# Patient Record
Sex: FEMALE | Race: OTHER
[De-identification: names, ages, dates, MRNs, and addresses within clinical notes are randomized per-mention and may not be internally consistent; named-entity substitution may affect disease eponyms.]

---

## 2020-06-04 ENCOUNTER — HOSPITAL ENCOUNTER (EMERGENCY)
Dept: HOSPITAL 8 - ED | Age: 40
Discharge: HOME | End: 2020-06-04
Payer: MEDICAID

## 2020-06-04 VITALS — DIASTOLIC BLOOD PRESSURE: 110 MMHG | SYSTOLIC BLOOD PRESSURE: 146 MMHG

## 2020-06-04 VITALS — BODY MASS INDEX: 30.74 KG/M2 | WEIGHT: 173.5 LBS | HEIGHT: 63 IN

## 2020-06-04 DIAGNOSIS — H00.015: Primary | ICD-10-CM

## 2020-06-04 DIAGNOSIS — F17.290: ICD-10-CM

## 2020-06-04 PROCEDURE — 99283 EMERGENCY DEPT VISIT LOW MDM: CPT

## 2020-06-04 NOTE — NUR
THIS IS A 39Y F THAT COMES IN FOR SWELLING AND PAIN WORSENING OVER DAYS. PT 
DENIES TRAUMA OR VISION CHANGES.

## 2020-06-19 ENCOUNTER — HOSPITAL ENCOUNTER (EMERGENCY)
Dept: HOSPITAL 8 - ED | Age: 40
Discharge: LEFT BEFORE DISCHARGE | End: 2020-06-19
Payer: MEDICAID

## 2020-06-19 VITALS — SYSTOLIC BLOOD PRESSURE: 139 MMHG | DIASTOLIC BLOOD PRESSURE: 86 MMHG

## 2020-06-19 VITALS — BODY MASS INDEX: 30.7 KG/M2 | WEIGHT: 173.28 LBS | HEIGHT: 63 IN

## 2020-06-19 DIAGNOSIS — H00.016: Primary | ICD-10-CM

## 2020-06-19 DIAGNOSIS — Z53.21: ICD-10-CM

## 2021-06-28 ENCOUNTER — HOSPITAL ENCOUNTER (EMERGENCY)
Dept: HOSPITAL 8 - ED | Age: 41
Discharge: HOME | End: 2021-06-28
Payer: MEDICAID

## 2021-06-28 VITALS — HEIGHT: 63 IN | WEIGHT: 187.83 LBS | BODY MASS INDEX: 33.28 KG/M2

## 2021-06-28 VITALS — SYSTOLIC BLOOD PRESSURE: 141 MMHG | DIASTOLIC BLOOD PRESSURE: 81 MMHG

## 2021-06-28 DIAGNOSIS — R06.02: ICD-10-CM

## 2021-06-28 DIAGNOSIS — B34.9: ICD-10-CM

## 2021-06-28 DIAGNOSIS — J06.9: Primary | ICD-10-CM

## 2021-06-28 DIAGNOSIS — Z20.822: ICD-10-CM

## 2021-06-28 PROCEDURE — 71045 X-RAY EXAM CHEST 1 VIEW: CPT

## 2021-06-28 PROCEDURE — 96375 TX/PRO/DX INJ NEW DRUG ADDON: CPT

## 2021-06-28 PROCEDURE — U0003 INFECTIOUS AGENT DETECTION BY NUCLEIC ACID (DNA OR RNA); SEVERE ACUTE RESPIRATORY SYNDROME CORONAVIRUS 2 (SARS-COV-2) (CORONAVIRUS DISEASE [COVID-19]), AMPLIFIED PROBE TECHNIQUE, MAKING USE OF HIGH THROUGHPUT TECHNOLOGIES AS DESCRIBED BY CMS-2020-01-R: HCPCS

## 2021-06-28 PROCEDURE — 96361 HYDRATE IV INFUSION ADD-ON: CPT

## 2021-06-28 PROCEDURE — 96374 THER/PROPH/DIAG INJ IV PUSH: CPT

## 2021-06-28 PROCEDURE — 99284 EMERGENCY DEPT VISIT MOD MDM: CPT

## 2021-06-28 NOTE — NUR
Patient given discharge instructions and RX, they have confirmed that they 
understand the instructions.  Patient ambulatory with steady gait.

## 2021-08-20 ENCOUNTER — HOSPITAL ENCOUNTER (EMERGENCY)
Dept: HOSPITAL 8 - ED | Age: 41
Discharge: LEFT BEFORE DISCHARGE | End: 2021-08-20
Payer: MEDICAID

## 2021-08-20 VITALS — DIASTOLIC BLOOD PRESSURE: 95 MMHG | SYSTOLIC BLOOD PRESSURE: 139 MMHG

## 2021-08-20 VITALS — HEIGHT: 63 IN | BODY MASS INDEX: 33.95 KG/M2 | WEIGHT: 191.58 LBS

## 2021-08-20 DIAGNOSIS — R50.9: Primary | ICD-10-CM

## 2021-08-20 DIAGNOSIS — Z53.21: ICD-10-CM

## 2021-08-30 ENCOUNTER — APPOINTMENT (OUTPATIENT)
Dept: URGENT CARE | Facility: PHYSICIAN GROUP | Age: 41
End: 2021-08-30
Payer: MEDICAID

## 2021-09-22 ENCOUNTER — OFFICE VISIT (OUTPATIENT)
Dept: URGENT CARE | Facility: CLINIC | Age: 41
End: 2021-09-22
Payer: MEDICAID

## 2021-09-22 ENCOUNTER — APPOINTMENT (OUTPATIENT)
Dept: RADIOLOGY | Facility: IMAGING CENTER | Age: 41
End: 2021-09-22
Attending: NURSE PRACTITIONER
Payer: MEDICAID

## 2021-09-22 VITALS
OXYGEN SATURATION: 100 % | TEMPERATURE: 97.6 F | HEIGHT: 63 IN | DIASTOLIC BLOOD PRESSURE: 86 MMHG | HEART RATE: 107 BPM | SYSTOLIC BLOOD PRESSURE: 122 MMHG | BODY MASS INDEX: 34.48 KG/M2 | WEIGHT: 194.6 LBS | RESPIRATION RATE: 14 BRPM

## 2021-09-22 DIAGNOSIS — M79.641 RIGHT HAND PAIN: ICD-10-CM

## 2021-09-22 DIAGNOSIS — S66.911A HAND STRAIN, RIGHT, INITIAL ENCOUNTER: ICD-10-CM

## 2021-09-22 PROCEDURE — 73130 X-RAY EXAM OF HAND: CPT | Mod: TC,RT | Performed by: NURSE PRACTITIONER

## 2021-09-22 PROCEDURE — 99204 OFFICE O/P NEW MOD 45 MIN: CPT | Performed by: NURSE PRACTITIONER

## 2021-09-22 RX ORDER — IBUPROFEN 800 MG/1
800 TABLET ORAL EVERY 8 HOURS PRN
Qty: 30 TABLET | Refills: 0 | Status: SHIPPED | OUTPATIENT
Start: 2021-09-22 | End: 2022-01-07

## 2021-09-22 RX ORDER — PREDNISONE 20 MG/1
60 TABLET ORAL DAILY
Qty: 15 TABLET | Refills: 0 | Status: SHIPPED | OUTPATIENT
Start: 2021-09-22 | End: 2021-09-27

## 2021-09-22 ASSESSMENT — ENCOUNTER SYMPTOMS
CONSTITUTIONAL NEGATIVE: 1
TINGLING: 1

## 2021-09-22 ASSESSMENT — VISUAL ACUITY: OU: 1

## 2021-09-22 NOTE — PROGRESS NOTES
"Subjective:     Jagruti Harrison is a 41 y.o. female who presents for Hand Pain (x 4 days ago radiating up R shoulder.  denies any known injury. )       Other  This is a new problem. The problem has been gradually worsening.     Patient reports that 4 days ago, she started to notice right hand pain and tenderness.  Feels pain radiating from her right hand up her forearm and her upper arm.  Denies injury.  Denies history of similar problems.    Patient was screened prior to rooming and denied COVID-19 diagnosis or contact with a person who has been diagnosed or is suspected to have COVID-19. During this visit, appropriate PPE was worn, hand hygiene was performed, and the patient and any visitors were masked.     PMH:  has no past medical history on file.    MEDS: No current outpatient medications on file.    ALLERGIES: No Known Allergies    SURGHX: No past surgical history on file.    SOCHX:  reports that she has never smoked. She has never used smokeless tobacco. She reports that she does not drink alcohol and does not use drugs.     FH: Reviewed with patient, not pertinent to this visit.    Review of Systems   Constitutional: Negative.    Musculoskeletal:        Right hand pain radiating up arm   Neurological: Positive for tingling.   All other systems reviewed and are negative.    Additional details per HPI.      Objective:     /86   Pulse (!) 107   Temp 36.4 °C (97.6 °F) (Temporal)   Resp 14   Ht 1.6 m (5' 3\")   Wt 88.3 kg (194 lb 9.6 oz)   SpO2 100%   BMI 34.47 kg/m²     Physical Exam  Vitals reviewed.   Constitutional:       General: She is not in acute distress.     Appearance: She is well-developed. She is not ill-appearing or toxic-appearing.   HENT:      Head: Normocephalic.      Right Ear: External ear normal.      Left Ear: External ear normal.   Eyes:      General: Vision grossly intact.      Extraocular Movements: Extraocular movements intact.      Conjunctiva/sclera: Conjunctivae normal. "   Cardiovascular:      Rate and Rhythm: Normal rate.   Pulmonary:      Effort: Pulmonary effort is normal. No respiratory distress.   Musculoskeletal:         General: No deformity. Normal range of motion.      Right shoulder: No swelling, deformity or tenderness. Normal range of motion.      Right upper arm: No swelling, deformity, lacerations or tenderness.      Right elbow: No swelling, deformity or lacerations. Normal range of motion. No tenderness.      Right forearm: No swelling, deformity, lacerations or tenderness.      Right wrist: No swelling, deformity, lacerations or tenderness. Normal range of motion.      Right hand: Swelling (Mild compared left) and tenderness (Volar aspect) present. No deformity or lacerations. Normal range of motion. Normal strength. Normal sensation. Normal capillary refill.      Cervical back: Normal range of motion.   Skin:     General: Skin is warm and dry.      Capillary Refill: Capillary refill takes less than 2 seconds.      Coloration: Skin is not pale.      Findings: No bruising, erythema or rash.   Neurological:      Mental Status: She is alert and oriented to person, place, and time.      Sensory: No sensory deficit.      Motor: No weakness.      Coordination: Coordination normal.   Psychiatric:         Behavior: Behavior normal. Behavior is cooperative.     X-ray of right hand:    ***    Radiology report and images reviewed by myself. Concur with findings.***      Assessment/Plan:     1. Right hand pain  - DX-HAND 3+ RIGHT; Future    ***    Differential diagnosis, natural history, supportive care, ***over-the-counter symptom management per 's instructions, ***close monitoring, and indications for immediate follow-up discussed.     All questions answered. Patient agrees with the plan of care.    ***

## 2021-09-22 NOTE — PROGRESS NOTES
"Subjective:     Jagruti Harrison is a 41 y.o. female who presents for Hand Pain (x 4 days ago radiating up R shoulder.  denies any known injury. )       Other  This is a new problem. The problem has been gradually worsening.     Patient reports that 4 days ago, she started to notice right hand pain and tenderness.  Feels pain radiating from her right hand up her forearm and her upper arm.  Hand and fingers feel tingly.  Denies injury.  Denies history of similar problems.  Right hand dominant.  Tx: none.    Patient was screened prior to rooming and denied COVID-19 diagnosis or contact with a person who has been diagnosed or is suspected to have COVID-19. During this visit, appropriate PPE was worn, hand hygiene was performed, and the patient and any visitors were masked.     PMH:  has no past medical history on file.    MEDS:   Current Outpatient Medications:   •  predniSONE (DELTASONE) 20 MG Tab, Take 3 Tablets by mouth every day for 5 days., Disp: 15 Tablet, Rfl: 0  •  ibuprofen (MOTRIN) 800 MG Tab, Take 1 Tablet by mouth every 8 hours as needed for Moderate Pain or Inflammation. Start 24 hours after finishing prednisone., Disp: 30 Tablet, Rfl: 0    ALLERGIES: No Known Allergies    SURGHX: History reviewed. No pertinent surgical history.    SOCHX:  reports that she has never smoked. She has never used smokeless tobacco. She reports that she does not drink alcohol and does not use drugs.     FH: Reviewed with patient, not pertinent to this visit.    Review of Systems   Constitutional: Negative.    Musculoskeletal:        Right hand pain radiating up arm   Neurological: Positive for tingling.   All other systems reviewed and are negative.    Additional details per HPI.      Objective:     /86   Pulse (!) 107   Temp 36.4 °C (97.6 °F) (Temporal)   Resp 14   Ht 1.6 m (5' 3\")   Wt 88.3 kg (194 lb 9.6 oz)   SpO2 100%   BMI 34.47 kg/m²     Physical Exam  Vitals reviewed.   Constitutional:       General: She " is not in acute distress.     Appearance: She is well-developed. She is not ill-appearing or toxic-appearing.   HENT:      Head: Normocephalic.      Right Ear: External ear normal.      Left Ear: External ear normal.   Eyes:      General: Vision grossly intact.      Extraocular Movements: Extraocular movements intact.      Conjunctiva/sclera: Conjunctivae normal.   Cardiovascular:      Rate and Rhythm: Normal rate.  Pulmonary:      Effort: Pulmonary effort is normal. No respiratory distress.   Musculoskeletal:         General: No deformity. Normal range of motion.      Right shoulder: No swelling, deformity or tenderness. Normal range of motion.      Right upper arm: No swelling, deformity, lacerations or tenderness.      Right elbow: No swelling, deformity or lacerations. Normal range of motion. No tenderness.      Right forearm: No swelling, deformity, lacerations or tenderness.      Right wrist: No swelling, deformity, lacerations or tenderness. Normal range of motion.      Right hand: Swelling (Mild compared left) and tenderness (Volar aspect) present. No deformity or lacerations. Normal range of motion. Normal strength. Normal sensation. Normal capillary refill.      Cervical back: Normal range of motion.   Skin:     General: Skin is warm and dry.      Capillary Refill: Capillary refill takes less than 2 seconds.      Coloration: Skin is not pale.      Findings: No bruising, erythema or rash.   Neurological:      Mental Status: She is alert and oriented to person, place, and time.      Sensory: No sensory deficit.      Motor: No weakness.      Coordination: Coordination normal.   Psychiatric:         Behavior: Behavior normal. Behavior is cooperative.     X-ray of right hand:    Details    Reading Physician Reading Date Result Priority   Alvaro Magaña M.D.  [phone] [facility]   Narrative & Impression     9/22/2021 10:46 AM     HISTORY/REASON FOR EXAM:  Atraumatic Pain/Swelling/Deformity.  RIGHT  hand pain.     TECHNIQUE/EXAM DESCRIPTION AND NUMBER OF VIEWS:  3 views of the RIGHT hand.     COMPARISON: None     FINDINGS:  No focal soft tissue swelling.  Joint spaces are preserved.  No marginal erosions or soft tissue calcifications.  No fracture or dislocation.     IMPRESSION:     Unremarkable RIGHT hand.        Exam Ended: 09/22/21 10:55 AM Last Resulted: 09/22/21 10:59 AM        Radiology report and images reviewed by myself. Concur with findings.      Assessment/Plan:     1. Right hand pain  - DX-HAND 3+ RIGHT; Future  - predniSONE (DELTASONE) 20 MG Tab; Take 3 Tablets by mouth every day for 5 days.  Dispense: 15 Tablet; Refill: 0  - ibuprofen (MOTRIN) 800 MG Tab; Take 1 Tablet by mouth every 8 hours as needed for Moderate Pain or Inflammation. Start 24 hours after finishing prednisone.  Dispense: 30 Tablet; Refill: 0  - REFERRAL TO SPORTS MEDICINE    2. Hand strain, right, initial encounter  - predniSONE (DELTASONE) 20 MG Tab; Take 3 Tablets by mouth every day for 5 days.  Dispense: 15 Tablet; Refill: 0  - ibuprofen (MOTRIN) 800 MG Tab; Take 1 Tablet by mouth every 8 hours as needed for Moderate Pain or Inflammation. Start 24 hours after finishing prednisone.  Dispense: 30 Tablet; Refill: 0  - REFERRAL TO SPORTS MEDICINE    Rx as above sent electronically.    Work note provided.    Discharge summary provided.     Differential diagnosis, natural history, supportive care, over-the-counter symptom management per 's instructions, close monitoring, and indications for immediate follow-up discussed.     All questions answered. Patient agrees with the plan of care.

## 2021-09-22 NOTE — PATIENT INSTRUCTIONS
Details    Reading Physician Reading Date Result Priority   Alvaro Magaña M.D.  [phone] [facility]/22/2021 [facility]   Narrative & Impression     9/22/2021 10:46 AM     HISTORY/REASON FOR EXAM:  Atraumatic Pain/Swelling/Deformity.  RIGHT hand pain.     TECHNIQUE/EXAM DESCRIPTION AND NUMBER OF VIEWS:  3 views of the RIGHT hand.     COMPARISON: None     FINDINGS:  No focal soft tissue swelling.  Joint spaces are preserved.  No marginal erosions or soft tissue calcifications.  No fracture or dislocation.     IMPRESSION:     Unremarkable RIGHT hand.        Exam Ended: 09/22/21 10:55 AM Last Resulted: 09/22/21 10:59 AM            Repetitive Strain Injuries  Repetitive strain injuries (RSIs) result from the overuse or misuse of muscles, nerves, or tissues that connect muscle to bone (tendons). RSIs can affect almost any part of the body. However, RSIs are most common in:  · The arms, including the thumbs, wrists, elbows, and shoulders.  · The legs, including the ankles and knees.  Repetitive strain often causes certain common medical conditions, such as carpal tunnel syndrome, trigger finger, tennis elbow, and golfer's elbow.  What are the causes?  RSIs are caused by repeating the same activity for long periods of time without enough rest. Repetitive strain often results from activities done:  · At work, such as typing for long periods of time.  · During a hobby or sport.  What increases the risk?  Certain workplace and personal factors may make you more likely to develop an RSI.  Workplace risk factors  · Frequent computer use.  · Not taking rest breaks.  · Doing motions over and over again.  · Working in an awkward position or holding the same position for a long time.  · Forceful movements, such as lifting, pulling, or pushing.  · Exposure to the vibration caused by using power tools.  · Working in cold temperatures.  Personal risk factors  · Having poor posture.  · Not exercising regularly.  · Being  overweight.  · Having long-term (chronic) medical conditions, such as arthritis, diabetes, or thyroid problems.  · Having vitamin deficiencies.  What are the signs or symptoms?  You may feel these symptoms at the affected body part:  · Burning, shooting, or aching pain.  · Tenderness.  · Swelling.  · Tingling or numbness.  · Weakness, heaviness, or loss of coordination.  · Fatigue.  · Muscle stiffness.  · Sudden, involuntary muscle tightening (spasms).  · Difficulty moving.  How is this diagnosed?  An RSI may be diagnosed with a physical exam and an evaluation of your everyday activities. You may have tests, including:  · X-rays.  · Electromyogram (EMG). This test measures electrical signals that your nerves send to your muscles.  How is this treated?  Treatment depends on the severity and type of RSI you have. Treatment may include:  · Rest.  · Ice or heat therapy.  · NSAIDS to reduce pain and swelling.  · Steroid injections.  · Wearing a splint.  · Wearing a wrap that applies pressure (compression bandage).  · Having occupational or physical therapy.  · Having surgery.  RSIs may take weeks or months to heal, depending on your condition.  Follow these instructions at home:  If you have a splint:  · Wear the splint as told by your health care provider. Remove it only as told by your health care provider.  · Loosen the splint if your fingers or toes tingle, become numb, or turn cold and blue.  · Keep the splint clean.  · If the splint is not waterproof:  ? Do not let it get wet.  ? Cover it with a watertight covering when you take a bath or shower.  Managing pain, stiffness, and swelling         · If directed, put ice on the injured area.  ? If you have a removable splint, remove it as told by your health care provider.  ? Put ice in a plastic bag.  ? Place a towel between your skin and the bag.  ? Leave the ice on for 20 minutes, 2-3 times a day.  · If directed, apply heat to the affected area as often as told by  your health care provider. Use the heat source that your health care provider recommends, such as a moist heat pack or a heating pad.  ? Place a towel between your skin and the heat source.  ? Leave the heat on for 20-30 minutes.  ? Remove the heat if your skin turns bright red. This is especially important if you are unable to feel pain, heat, or cold. You may have a greater risk of getting burned.  · If your arm or leg is affected, move your fingers or toes often to reduce stiffness and swelling.  · If possible, raise (elevate) the injured area above the level of your heart while you are sitting or lying down.  Activity  · Rest your affected body part as told by your health care provider.  · Stop or modify activities that cause your symptoms or make them worse, as told by your health care provider.  · Return to your normal activities as told by your health care provider. Ask your health care provider what activities are safe for you.  · Do exercises as told by your health care provider.  General instructions  · Take over-the-counter and prescription medicines only as told by your health care provider.  · Wear a compression bandage as told by your health care provider.  · If your condition is work-related, talk to your employer about changes that can be made.  · Keep all follow-up visits as told by your health care provider. This is important.  How is this prevented?  Maintain good posture  Good posture means that:  · Your spine is in its natural position.  · Your feet are flat on the floor.  · Your knees are directly over your feet.  · Your arms are relaxed and at your sides.  · Your neck is relaxed and not bent forward or backward.  Adjust your work station  Arrange your work station so that you can maintain good posture. To do this:  · Sit in a chair that supports the natural curve of your lower back.  · Slide your chair under your desk so that you are close enough to reach your keyboard and computer mouse with  your elbows at your side, bent at a right angle, and your forearms parallel to the ground.  · Have your computer monitor directly in front of you so that your eyes are level with the top of the screen. The screen should be about 15-25 inches (38.1-63.5 cm) from your eyes.    Other tips  · Take breaks often from any repeated activity. Alternate with another task that requires you to use different muscles.  · Change positions regularly.  · Exercise regularly.  · Maintain a healthy weight.  Contact a health care provider if:  · You develop new symptoms.  · Your symptoms get worse or do not improve with medicine.  Summary  · Repetitive strain injuries (RSIs) result from the overuse or misuse of muscles, nerves, or tissues that connect muscle to bone (tendons).  · Symptoms are pain, burning, swelling, stiffness, weakness, numbness, or spasms in the affected area.  · These injuries are treated with rest, ice, heat, medicines, physical or occupational therapy, splinting, and surgery if needed.  This information is not intended to replace advice given to you by your health care provider. Make sure you discuss any questions you have with your health care provider.  Document Released: 12/08/2003 Document Revised: 11/14/2019 Document Reviewed: 11/14/2019  [employer] Patient Education © 2020 [employer]      A referral request has been placed for sports medicine. We have a referrals department that is tasked with locating a suitable office that currently accepts patients and your insurance and you should be receiving referral information from them such as the office name, address, and number. This process usually takes around 3 business days. If you are not contacted by our referrals department, please call [phone].

## 2021-09-22 NOTE — LETTER
September 22, 2021         Patient: Jagruti Harrison   YOB: 1980   Date of Visit: 9/22/2021           To Whom it May Concern:    Jagruti Harrison was seen in my clinic on 9/22/2021. Due to medical necessity, patient is advised of the following physical restrictions:    · No pushing, pulling, lifting, fine manipulations, grasping with right hand    Restrictions in effect for the next 7 days or until as advised by sports medicine.     If you have any questions or concerns, please don't hesitate to call.        Sincerely,           PRISCILLA Olivares.  Electronically Signed

## 2021-11-09 ENCOUNTER — OFFICE VISIT (OUTPATIENT)
Dept: URGENT CARE | Facility: CLINIC | Age: 41
End: 2021-11-09
Payer: MEDICAID

## 2021-11-09 ENCOUNTER — HOSPITAL ENCOUNTER (OUTPATIENT)
Facility: MEDICAL CENTER | Age: 41
End: 2021-11-09
Attending: NURSE PRACTITIONER
Payer: MEDICAID

## 2021-11-09 VITALS
HEART RATE: 94 BPM | HEIGHT: 63 IN | DIASTOLIC BLOOD PRESSURE: 80 MMHG | BODY MASS INDEX: 32.92 KG/M2 | WEIGHT: 185.8 LBS | TEMPERATURE: 98.5 F | SYSTOLIC BLOOD PRESSURE: 132 MMHG | OXYGEN SATURATION: 100 % | RESPIRATION RATE: 16 BRPM

## 2021-11-09 DIAGNOSIS — N20.0 KIDNEY STONE: ICD-10-CM

## 2021-11-09 DIAGNOSIS — R31.9 HEMATURIA, UNSPECIFIED TYPE: ICD-10-CM

## 2021-11-09 DIAGNOSIS — R10.9 FLANK PAIN: ICD-10-CM

## 2021-11-09 LAB
APPEARANCE UR: NORMAL
BILIRUB UR STRIP-MCNC: NEGATIVE MG/DL
COLOR UR AUTO: YELLOW
GLUCOSE UR STRIP.AUTO-MCNC: NEGATIVE MG/DL
INT CON NEG: NEGATIVE
INT CON POS: POSITIVE
KETONES UR STRIP.AUTO-MCNC: NEGATIVE MG/DL
LEUKOCYTE ESTERASE UR QL STRIP.AUTO: NORMAL
NITRITE UR QL STRIP.AUTO: NEGATIVE
PH UR STRIP.AUTO: 5.5 [PH] (ref 5–8)
POC URINE PREGNANCY TEST: NEGATIVE
PROT UR QL STRIP: NEGATIVE MG/DL
RBC UR QL AUTO: NORMAL
SP GR UR STRIP.AUTO: >=1.03
UROBILINOGEN UR STRIP-MCNC: NORMAL MG/DL

## 2021-11-09 PROCEDURE — 99215 OFFICE O/P EST HI 40 MIN: CPT | Performed by: NURSE PRACTITIONER

## 2021-11-09 PROCEDURE — 81025 URINE PREGNANCY TEST: CPT | Performed by: NURSE PRACTITIONER

## 2021-11-09 PROCEDURE — 81002 URINALYSIS NONAUTO W/O SCOPE: CPT | Performed by: NURSE PRACTITIONER

## 2021-11-09 PROCEDURE — 87086 URINE CULTURE/COLONY COUNT: CPT

## 2021-11-09 PROCEDURE — 87186 SC STD MICRODIL/AGAR DIL: CPT

## 2021-11-09 RX ORDER — SULFAMETHOXAZOLE AND TRIMETHOPRIM 800; 160 MG/1; MG/1
1 TABLET ORAL 2 TIMES DAILY
Qty: 20 TABLET | Refills: 0 | Status: SHIPPED | OUTPATIENT
Start: 2021-11-09 | End: 2021-11-19

## 2021-11-09 NOTE — LETTER
November 10, 2021         Patient: Jagruti Harrison   YOB: 1980   Date of Visit: 11/9/2021           To Whom it May Concern:    Jagruti Harrison was seen in my clinic on 11/9/2021. She may return to work on 11/10/21.    If you have any questions or concerns, please don't hesitate to call.        Sincerely,           PRISCILLA Mata.  Electronically Signed

## 2021-11-10 ENCOUNTER — TELEPHONE (OUTPATIENT)
Dept: URGENT CARE | Facility: CLINIC | Age: 41
End: 2021-11-10

## 2021-11-10 ENCOUNTER — HOSPITAL ENCOUNTER (OUTPATIENT)
Dept: RADIOLOGY | Facility: MEDICAL CENTER | Age: 41
End: 2021-11-10
Attending: NURSE PRACTITIONER
Payer: MEDICAID

## 2021-11-10 DIAGNOSIS — R10.9 FLANK PAIN: ICD-10-CM

## 2021-11-10 DIAGNOSIS — R10.9 ABDOMINAL PAIN, UNSPECIFIED ABDOMINAL LOCATION: ICD-10-CM

## 2021-11-10 DIAGNOSIS — R31.9 HEMATURIA, UNSPECIFIED TYPE: ICD-10-CM

## 2021-11-10 PROCEDURE — 74176 CT ABD & PELVIS W/O CONTRAST: CPT

## 2021-11-10 RX ORDER — TAMSULOSIN HYDROCHLORIDE 0.4 MG/1
0.4 CAPSULE ORAL
Qty: 30 CAPSULE | Refills: 0 | Status: ON HOLD | OUTPATIENT
Start: 2021-11-10 | End: 2022-01-11

## 2021-11-10 RX ORDER — IBUPROFEN 600 MG/1
600 TABLET ORAL 2 TIMES DAILY PRN
COMMUNITY
Start: 2021-09-22 | End: 2023-08-06

## 2021-11-10 ASSESSMENT — ENCOUNTER SYMPTOMS
SORE THROAT: 0
PAIN: 1
MYALGIAS: 0
NAUSEA: 0
ABDOMINAL PAIN: 0
FEVER: 0
CHILLS: 0
SHORTNESS OF BREATH: 0
VOMITING: 0
EYE REDNESS: 0
DIZZINESS: 0
FLANK PAIN: 1
BACK PAIN: 1

## 2021-11-11 NOTE — PROGRESS NOTES
"Subjective:   Jagruti Harrison is a 41 y.o. female who presents for Low Back Pain (side and low back pain x 1 month)      Pain  This is a new problem. The current episode started 1 to 4 weeks ago. The problem occurs constantly. The problem has been gradually worsening. Pertinent negatives include no abdominal pain, chest pain, chills, fever, myalgias, nausea, rash, sore throat, urinary symptoms or vomiting. Nothing aggravates the symptoms. She has tried acetaminophen for the symptoms. The treatment provided no relief.       Review of Systems   Constitutional: Negative for chills and fever.   HENT: Negative for sore throat.    Eyes: Negative for redness.   Respiratory: Negative for shortness of breath.    Cardiovascular: Negative for chest pain.   Gastrointestinal: Negative for abdominal pain, nausea and vomiting.   Genitourinary: Positive for flank pain. Negative for dysuria, frequency, hematuria and urgency.   Musculoskeletal: Positive for back pain. Negative for myalgias.   Skin: Negative for rash.   Neurological: Negative for dizziness.       Medications:    • ibuprofen Tabs  • sulfamethoxazole-trimethoprim    Allergies: Patient has no known allergies.    Problem List: Jagruti Harrison does not have a problem list on file.    Surgical History:  No past surgical history on file.    Past Social Hx: Jagruti Harrison  reports that she has never smoked. She has never used smokeless tobacco. She reports that she does not drink alcohol and does not use drugs.     Past Family Hx:  Jagruti Harrison family history is not on file.     Problem list, medications, and allergies reviewed by myself today in Epic.     Objective:     /80 (BP Location: Right arm, Patient Position: Sitting, BP Cuff Size: Adult)   Pulse 94   Temp 36.9 °C (98.5 °F) (Temporal)   Resp 16   Ht 1.6 m (5' 3\")   Wt 84.3 kg (185 lb 12.8 oz)   SpO2 100%   BMI 32.91 kg/m²     Physical Exam  Vitals and nursing note reviewed.   Constitutional:  "      General: She is not in acute distress.     Appearance: She is well-developed.   HENT:      Head: Normocephalic and atraumatic.      Right Ear: Tympanic membrane and external ear normal.      Left Ear: Tympanic membrane and external ear normal.      Nose: Nose normal.      Right Sinus: No maxillary sinus tenderness or frontal sinus tenderness.      Left Sinus: No maxillary sinus tenderness or frontal sinus tenderness.      Mouth/Throat:      Mouth: Mucous membranes are moist.      Pharynx: Uvula midline. No posterior oropharyngeal erythema.      Tonsils: No tonsillar exudate or tonsillar abscesses.   Eyes:      General:         Right eye: No discharge.         Left eye: No discharge.      Conjunctiva/sclera: Conjunctivae normal.   Cardiovascular:      Rate and Rhythm: Normal rate.   Pulmonary:      Effort: Pulmonary effort is normal. No respiratory distress.      Breath sounds: Normal breath sounds.   Abdominal:      General: Bowel sounds are normal. There is no distension.      Tenderness: There is no abdominal tenderness. There is left CVA tenderness. There is no right CVA tenderness, guarding or rebound. Negative signs include Vasquez's sign, Rovsing's sign, McBurney's sign, psoas sign and obturator sign.   Musculoskeletal:         General: Normal range of motion.   Skin:     General: Skin is warm and dry.   Neurological:      General: No focal deficit present.      Mental Status: She is alert and oriented to person, place, and time. Mental status is at baseline.      Gait: Gait (gait at baseline) normal.   Psychiatric:         Judgment: Judgment normal.         Assessment/Plan:     Diagnosis and associated orders:     1. Hematuria, unspecified type  sulfamethoxazole-trimethoprim (BACTRIM DS) 800-160 MG tablet    POCT Urinalysis    URINE CULTURE(NEW)    POCT Pregnancy    Referral to Urology   2. Flank pain  sulfamethoxazole-trimethoprim (BACTRIM DS) 800-160 MG tablet    POCT Urinalysis    URINE CULTURE(NEW)     POCT Pregnancy    Referral to Urology   3. Kidney stone  tamsulosin (FLOMAX) 0.4 MG capsule    Referral to Urology        Comments/MDM:     Results for orders placed or performed in visit on 11/09/21   POCT Urinalysis   Result Value Ref Range    POC Color Yellow Negative    POC Appearance Cloudy Negative    POC Leukocyte Esterase Moderate Negative    POC Nitrites Negative Negative    POC Urobiligen 1.0 E.U./dl Negative (0.2) mg/dL    POC Protein Negative Negative mg/dL    POC Urine PH 5.5 5.0 - 8.0    POC Blood Moderate Negative    POC Specific Gravity >=1.030 <1.005 - >1.030    POC Ketones Negative Negative mg/dL    POC Bilirubin Negative Negative mg/dL    POC Glucose Negative Negative mg/dL   POCT Pregnancy   Result Value Ref Range    POC Urine Pregnancy Test negative Negative    Internal Control Positive Positive     Internal Control Negative Negative  •       Patient 41-year-old female present with stated above, patient having left-sided CVA tenderness, hematuria noted, diagnostic CT will be obtained to rule out presence of stone.  Urinalysis with leukocytes concerning of infectious etiology will be treated for suspected bacterial etiology.  Patient declining Rocephin injection as she does not tolerate needles.  We will follow-up with pending results and treat as indicated.      Addendum: Telephone encounter 11/10: Contact patient in regards to CT findings, nonobstructing stones present.  Will patient continue with prescribed antibiotics until urine culture is finalized.  Will place urgent referral to urology for follow-up for further evaluation management.  Encouraged to continue with high-dose anti-inflammatories, will start on Flomax.  Differential diagnosis, natural history, supportive care, and indications for immediate follow-up discussed.             My total time spent caring for the patient on the day of the encounter was 45 minutes.   This does not include time spent on separately billable  procedures/tests.      Please note that this dictation was created using voice recognition software. I have made a reasonable attempt to correct obvious errors, but I expect that there are errors of grammar and possibly content that I did not discover before finalizing the note.    This note was electronically signed by Paul LEE.

## 2021-11-13 LAB
BACTERIA UR CULT: ABNORMAL
BACTERIA UR CULT: ABNORMAL
SIGNIFICANT IND 70042: ABNORMAL
SITE SITE: ABNORMAL
SOURCE SOURCE: ABNORMAL

## 2021-11-22 ENCOUNTER — HOSPITAL ENCOUNTER (OUTPATIENT)
Facility: MEDICAL CENTER | Age: 41
End: 2021-11-22
Attending: UROLOGY
Payer: MEDICAID

## 2021-11-22 PROCEDURE — 87086 URINE CULTURE/COLONY COUNT: CPT

## 2021-11-25 LAB
BACTERIA UR CULT: NORMAL
SIGNIFICANT IND 70042: NORMAL
SITE SITE: NORMAL
SOURCE SOURCE: NORMAL

## 2022-01-07 ENCOUNTER — PRE-ADMISSION TESTING (OUTPATIENT)
Dept: ADMISSIONS | Facility: MEDICAL CENTER | Age: 42
End: 2022-01-07
Attending: UROLOGY
Payer: MEDICAID

## 2022-01-07 DIAGNOSIS — Z01.812 PRE-OPERATIVE LABORATORY EXAMINATION: ICD-10-CM

## 2022-01-07 LAB
ANION GAP SERPL CALC-SCNC: 14 MMOL/L (ref 7–16)
APPEARANCE UR: ABNORMAL
BACTERIA #/AREA URNS HPF: ABNORMAL /HPF
BASOPHILS # BLD AUTO: 0.4 % (ref 0–1.8)
BASOPHILS # BLD: 0.04 K/UL (ref 0–0.12)
BILIRUB UR QL STRIP.AUTO: NEGATIVE
BUN SERPL-MCNC: 12 MG/DL (ref 8–22)
CALCIUM SERPL-MCNC: 8.7 MG/DL (ref 8.5–10.5)
CHLORIDE SERPL-SCNC: 105 MMOL/L (ref 96–112)
CO2 SERPL-SCNC: 19 MMOL/L (ref 20–33)
COLOR UR: ABNORMAL
CREAT SERPL-MCNC: 0.67 MG/DL (ref 0.5–1.4)
EOSINOPHIL # BLD AUTO: 0.52 K/UL (ref 0–0.51)
EOSINOPHIL NFR BLD: 5.5 % (ref 0–6.9)
EPI CELLS #/AREA URNS HPF: ABNORMAL /HPF
ERYTHROCYTE [DISTWIDTH] IN BLOOD BY AUTOMATED COUNT: 38.4 FL (ref 35.9–50)
GLUCOSE SERPL-MCNC: 98 MG/DL (ref 65–99)
GLUCOSE UR STRIP.AUTO-MCNC: NEGATIVE MG/DL
HCT VFR BLD AUTO: 42.5 % (ref 37–47)
HGB BLD-MCNC: 14.5 G/DL (ref 12–16)
HYALINE CASTS #/AREA URNS LPF: ABNORMAL /LPF
IMM GRANULOCYTES # BLD AUTO: 0.03 K/UL (ref 0–0.11)
IMM GRANULOCYTES NFR BLD AUTO: 0.3 % (ref 0–0.9)
KETONES UR STRIP.AUTO-MCNC: ABNORMAL MG/DL
LEUKOCYTE ESTERASE UR QL STRIP.AUTO: NEGATIVE
LYMPHOCYTES # BLD AUTO: 1.9 K/UL (ref 1–4.8)
LYMPHOCYTES NFR BLD: 20.1 % (ref 22–41)
MCH RBC QN AUTO: 28.7 PG (ref 27–33)
MCHC RBC AUTO-ENTMCNC: 34.1 G/DL (ref 33.6–35)
MCV RBC AUTO: 84 FL (ref 81.4–97.8)
MICRO URNS: ABNORMAL
MONOCYTES # BLD AUTO: 0.52 K/UL (ref 0–0.85)
MONOCYTES NFR BLD AUTO: 5.5 % (ref 0–13.4)
NEUTROPHILS # BLD AUTO: 6.46 K/UL (ref 2–7.15)
NEUTROPHILS NFR BLD: 68.2 % (ref 44–72)
NITRITE UR QL STRIP.AUTO: POSITIVE
NRBC # BLD AUTO: 0 K/UL
NRBC BLD-RTO: 0 /100 WBC
PH UR STRIP.AUTO: 6 [PH] (ref 5–8)
PLATELET # BLD AUTO: 381 K/UL (ref 164–446)
PMV BLD AUTO: 9.4 FL (ref 9–12.9)
POTASSIUM SERPL-SCNC: 4.1 MMOL/L (ref 3.6–5.5)
PROT UR QL STRIP: 100 MG/DL
RBC # BLD AUTO: 5.06 M/UL (ref 4.2–5.4)
RBC # URNS HPF: ABNORMAL /HPF
RBC UR QL AUTO: ABNORMAL
SODIUM SERPL-SCNC: 138 MMOL/L (ref 135–145)
SP GR UR STRIP.AUTO: >=1.03
UROBILINOGEN UR STRIP.AUTO-MCNC: 0.2 MG/DL
WBC # BLD AUTO: 9.5 K/UL (ref 4.8–10.8)
WBC #/AREA URNS HPF: ABNORMAL /HPF

## 2022-01-07 PROCEDURE — U0005 INFEC AGEN DETEC AMPLI PROBE: HCPCS

## 2022-01-07 PROCEDURE — 81001 URINALYSIS AUTO W/SCOPE: CPT

## 2022-01-07 PROCEDURE — 85025 COMPLETE CBC W/AUTO DIFF WBC: CPT

## 2022-01-07 PROCEDURE — C9803 HOPD COVID-19 SPEC COLLECT: HCPCS

## 2022-01-07 PROCEDURE — 87186 SC STD MICRODIL/AGAR DIL: CPT

## 2022-01-07 PROCEDURE — 80048 BASIC METABOLIC PNL TOTAL CA: CPT

## 2022-01-07 PROCEDURE — 87077 CULTURE AEROBIC IDENTIFY: CPT

## 2022-01-07 PROCEDURE — 36415 COLL VENOUS BLD VENIPUNCTURE: CPT

## 2022-01-07 PROCEDURE — 87086 URINE CULTURE/COLONY COUNT: CPT

## 2022-01-07 PROCEDURE — U0003 INFECTIOUS AGENT DETECTION BY NUCLEIC ACID (DNA OR RNA); SEVERE ACUTE RESPIRATORY SYNDROME CORONAVIRUS 2 (SARS-COV-2) (CORONAVIRUS DISEASE [COVID-19]), AMPLIFIED PROBE TECHNIQUE, MAKING USE OF HIGH THROUGHPUT TECHNOLOGIES AS DESCRIBED BY CMS-2020-01-R: HCPCS

## 2022-01-08 LAB
SARS-COV-2 RNA RESP QL NAA+PROBE: NOTDETECTED
SPECIMEN SOURCE: NORMAL

## 2022-01-10 ENCOUNTER — ANESTHESIA EVENT (OUTPATIENT)
Dept: SURGERY | Facility: MEDICAL CENTER | Age: 42
End: 2022-01-10
Payer: MEDICAID

## 2022-01-11 ENCOUNTER — HOSPITAL ENCOUNTER (OUTPATIENT)
Facility: MEDICAL CENTER | Age: 42
End: 2022-01-11
Attending: UROLOGY | Admitting: UROLOGY
Payer: MEDICAID

## 2022-01-11 ENCOUNTER — APPOINTMENT (OUTPATIENT)
Dept: RADIOLOGY | Facility: MEDICAL CENTER | Age: 42
End: 2022-01-11
Attending: UROLOGY
Payer: MEDICAID

## 2022-01-11 ENCOUNTER — ANESTHESIA (OUTPATIENT)
Dept: SURGERY | Facility: MEDICAL CENTER | Age: 42
End: 2022-01-11
Payer: MEDICAID

## 2022-01-11 VITALS
HEIGHT: 63 IN | RESPIRATION RATE: 18 BRPM | BODY MASS INDEX: 32.73 KG/M2 | OXYGEN SATURATION: 91 % | WEIGHT: 184.75 LBS | HEART RATE: 95 BPM | TEMPERATURE: 97.1 F | SYSTOLIC BLOOD PRESSURE: 124 MMHG | DIASTOLIC BLOOD PRESSURE: 86 MMHG

## 2022-01-11 DIAGNOSIS — N20.0 NEPHROLITHIASIS: ICD-10-CM

## 2022-01-11 LAB — HCG UR QL: NEGATIVE

## 2022-01-11 PROCEDURE — 160046 HCHG PACU - 1ST 60 MINS PHASE II: Performed by: UROLOGY

## 2022-01-11 PROCEDURE — 700111 HCHG RX REV CODE 636 W/ 250 OVERRIDE (IP): Performed by: ANESTHESIOLOGY

## 2022-01-11 PROCEDURE — 160028 HCHG SURGERY MINUTES - 1ST 30 MINS LEVEL 3: Performed by: UROLOGY

## 2022-01-11 PROCEDURE — 160039 HCHG SURGERY MINUTES - EA ADDL 1 MIN LEVEL 3: Performed by: UROLOGY

## 2022-01-11 PROCEDURE — C2617 STENT, NON-COR, TEM W/O DEL: HCPCS | Performed by: UROLOGY

## 2022-01-11 PROCEDURE — 160009 HCHG ANES TIME/MIN: Performed by: UROLOGY

## 2022-01-11 PROCEDURE — 160002 HCHG RECOVERY MINUTES (STAT): Performed by: UROLOGY

## 2022-01-11 PROCEDURE — 81025 URINE PREGNANCY TEST: CPT

## 2022-01-11 PROCEDURE — C1769 GUIDE WIRE: HCPCS | Performed by: UROLOGY

## 2022-01-11 PROCEDURE — 160036 HCHG PACU - EA ADDL 30 MINS PHASE I: Performed by: UROLOGY

## 2022-01-11 PROCEDURE — C1758 CATHETER, URETERAL: HCPCS | Performed by: UROLOGY

## 2022-01-11 PROCEDURE — 160035 HCHG PACU - 1ST 60 MINS PHASE I: Performed by: UROLOGY

## 2022-01-11 PROCEDURE — 500062 HCHG BASKET: Performed by: UROLOGY

## 2022-01-11 PROCEDURE — 700105 HCHG RX REV CODE 258: Performed by: UROLOGY

## 2022-01-11 PROCEDURE — 160025 RECOVERY II MINUTES (STATS): Performed by: UROLOGY

## 2022-01-11 PROCEDURE — 700101 HCHG RX REV CODE 250: Performed by: UROLOGY

## 2022-01-11 PROCEDURE — 160048 HCHG OR STATISTICAL LEVEL 1-5: Performed by: UROLOGY

## 2022-01-11 PROCEDURE — 700101 HCHG RX REV CODE 250: Performed by: ANESTHESIOLOGY

## 2022-01-11 DEVICE — STENT UROLOGICAL POLARIS 6X26  ULTRA: Type: IMPLANTABLE DEVICE | Site: URETER | Status: FUNCTIONAL

## 2022-01-11 RX ORDER — HYDROMORPHONE HYDROCHLORIDE 1 MG/ML
0.1 INJECTION, SOLUTION INTRAMUSCULAR; INTRAVENOUS; SUBCUTANEOUS
Status: DISCONTINUED | OUTPATIENT
Start: 2022-01-11 | End: 2022-01-11 | Stop reason: HOSPADM

## 2022-01-11 RX ORDER — MAGNESIUM HYDROXIDE 1200 MG/15ML
LIQUID ORAL
Status: DISCONTINUED | OUTPATIENT
Start: 2022-01-11 | End: 2022-01-11 | Stop reason: HOSPADM

## 2022-01-11 RX ORDER — TAMSULOSIN HYDROCHLORIDE 0.4 MG/1
0.4 CAPSULE ORAL DAILY
Qty: 21 CAPSULE | Refills: 1 | Status: SHIPPED | OUTPATIENT
Start: 2022-01-11 | End: 2023-08-06

## 2022-01-11 RX ORDER — MAGNESIUM HYDROXIDE 1200 MG/15ML
LIQUID ORAL
Status: COMPLETED | OUTPATIENT
Start: 2022-01-11 | End: 2022-01-11

## 2022-01-11 RX ORDER — MIDAZOLAM HYDROCHLORIDE 1 MG/ML
INJECTION INTRAMUSCULAR; INTRAVENOUS PRN
Status: DISCONTINUED | OUTPATIENT
Start: 2022-01-11 | End: 2022-01-11 | Stop reason: SURG

## 2022-01-11 RX ORDER — HYDROMORPHONE HYDROCHLORIDE 1 MG/ML
0.4 INJECTION, SOLUTION INTRAMUSCULAR; INTRAVENOUS; SUBCUTANEOUS
Status: DISCONTINUED | OUTPATIENT
Start: 2022-01-11 | End: 2022-01-11 | Stop reason: HOSPADM

## 2022-01-11 RX ORDER — SODIUM CHLORIDE, SODIUM LACTATE, POTASSIUM CHLORIDE, CALCIUM CHLORIDE 600; 310; 30; 20 MG/100ML; MG/100ML; MG/100ML; MG/100ML
INJECTION, SOLUTION INTRAVENOUS CONTINUOUS
Status: DISCONTINUED | OUTPATIENT
Start: 2022-01-11 | End: 2022-01-11 | Stop reason: HOSPADM

## 2022-01-11 RX ORDER — LABETALOL HYDROCHLORIDE 5 MG/ML
5 INJECTION, SOLUTION INTRAVENOUS
Status: DISCONTINUED | OUTPATIENT
Start: 2022-01-11 | End: 2022-01-11 | Stop reason: HOSPADM

## 2022-01-11 RX ORDER — LIDOCAINE HYDROCHLORIDE 20 MG/ML
INJECTION, SOLUTION EPIDURAL; INFILTRATION; INTRACAUDAL; PERINEURAL PRN
Status: DISCONTINUED | OUTPATIENT
Start: 2022-01-11 | End: 2022-01-11 | Stop reason: SURG

## 2022-01-11 RX ORDER — KETOROLAC TROMETHAMINE 30 MG/ML
INJECTION, SOLUTION INTRAMUSCULAR; INTRAVENOUS PRN
Status: DISCONTINUED | OUTPATIENT
Start: 2022-01-11 | End: 2022-01-11 | Stop reason: SURG

## 2022-01-11 RX ORDER — HYDROMORPHONE HYDROCHLORIDE 2 MG/ML
INJECTION, SOLUTION INTRAMUSCULAR; INTRAVENOUS; SUBCUTANEOUS PRN
Status: DISCONTINUED | OUTPATIENT
Start: 2022-01-11 | End: 2022-01-11 | Stop reason: SURG

## 2022-01-11 RX ORDER — ONDANSETRON 2 MG/ML
INJECTION INTRAMUSCULAR; INTRAVENOUS PRN
Status: DISCONTINUED | OUTPATIENT
Start: 2022-01-11 | End: 2022-01-11 | Stop reason: SURG

## 2022-01-11 RX ORDER — CEFTRIAXONE 1 G/1
1000 INJECTION, POWDER, FOR SOLUTION INTRAMUSCULAR; INTRAVENOUS ONCE
Status: DISCONTINUED | OUTPATIENT
Start: 2022-01-11 | End: 2022-01-11 | Stop reason: HOSPADM

## 2022-01-11 RX ORDER — HYDROCODONE BITARTRATE AND ACETAMINOPHEN 5; 325 MG/1; MG/1
1 TABLET ORAL EVERY 8 HOURS PRN
Qty: 15 TABLET | Refills: 0 | Status: SHIPPED | OUTPATIENT
Start: 2022-01-11 | End: 2022-01-16

## 2022-01-11 RX ORDER — CEFAZOLIN SODIUM 1 G/3ML
INJECTION, POWDER, FOR SOLUTION INTRAMUSCULAR; INTRAVENOUS PRN
Status: DISCONTINUED | OUTPATIENT
Start: 2022-01-11 | End: 2022-01-11 | Stop reason: SURG

## 2022-01-11 RX ORDER — HYDRALAZINE HYDROCHLORIDE 20 MG/ML
5 INJECTION INTRAMUSCULAR; INTRAVENOUS
Status: DISCONTINUED | OUTPATIENT
Start: 2022-01-11 | End: 2022-01-11 | Stop reason: HOSPADM

## 2022-01-11 RX ORDER — SODIUM CHLORIDE, SODIUM LACTATE, POTASSIUM CHLORIDE, CALCIUM CHLORIDE 600; 310; 30; 20 MG/100ML; MG/100ML; MG/100ML; MG/100ML
INJECTION, SOLUTION INTRAVENOUS CONTINUOUS
Status: ACTIVE | OUTPATIENT
Start: 2022-01-11 | End: 2022-01-11

## 2022-01-11 RX ORDER — ONDANSETRON 2 MG/ML
4 INJECTION INTRAMUSCULAR; INTRAVENOUS
Status: DISCONTINUED | OUTPATIENT
Start: 2022-01-11 | End: 2022-01-11 | Stop reason: HOSPADM

## 2022-01-11 RX ORDER — MEPERIDINE HYDROCHLORIDE 25 MG/ML
6.25 INJECTION INTRAMUSCULAR; INTRAVENOUS; SUBCUTANEOUS
Status: DISCONTINUED | OUTPATIENT
Start: 2022-01-11 | End: 2022-01-11 | Stop reason: HOSPADM

## 2022-01-11 RX ORDER — DEXAMETHASONE SODIUM PHOSPHATE 4 MG/ML
INJECTION, SOLUTION INTRA-ARTICULAR; INTRALESIONAL; INTRAMUSCULAR; INTRAVENOUS; SOFT TISSUE PRN
Status: DISCONTINUED | OUTPATIENT
Start: 2022-01-11 | End: 2022-01-11 | Stop reason: SURG

## 2022-01-11 RX ORDER — HYDROMORPHONE HYDROCHLORIDE 1 MG/ML
0.2 INJECTION, SOLUTION INTRAMUSCULAR; INTRAVENOUS; SUBCUTANEOUS
Status: DISCONTINUED | OUTPATIENT
Start: 2022-01-11 | End: 2022-01-11 | Stop reason: HOSPADM

## 2022-01-11 RX ORDER — OXYBUTYNIN CHLORIDE 5 MG/1
5 TABLET ORAL EVERY 8 HOURS PRN
Qty: 21 TABLET | Refills: 1 | Status: SHIPPED | OUTPATIENT
Start: 2022-01-11 | End: 2023-08-06

## 2022-01-11 RX ORDER — CEFUROXIME AXETIL 500 MG/1
500 TABLET ORAL 2 TIMES DAILY
COMMUNITY
End: 2022-04-30

## 2022-01-11 RX ORDER — DIPHENHYDRAMINE HYDROCHLORIDE 50 MG/ML
12.5 INJECTION INTRAMUSCULAR; INTRAVENOUS
Status: DISCONTINUED | OUTPATIENT
Start: 2022-01-11 | End: 2022-01-11 | Stop reason: HOSPADM

## 2022-01-11 RX ORDER — METOCLOPRAMIDE HYDROCHLORIDE 5 MG/ML
INJECTION INTRAMUSCULAR; INTRAVENOUS PRN
Status: DISCONTINUED | OUTPATIENT
Start: 2022-01-11 | End: 2022-01-11 | Stop reason: SURG

## 2022-01-11 RX ORDER — ACETAMINOPHEN 500 MG
500 TABLET ORAL EVERY 6 HOURS PRN
COMMUNITY
End: 2023-08-06

## 2022-01-11 RX ORDER — MIDAZOLAM HYDROCHLORIDE 1 MG/ML
1 INJECTION INTRAMUSCULAR; INTRAVENOUS
Status: DISCONTINUED | OUTPATIENT
Start: 2022-01-11 | End: 2022-01-11 | Stop reason: HOSPADM

## 2022-01-11 RX ADMIN — SODIUM CHLORIDE, POTASSIUM CHLORIDE, SODIUM LACTATE AND CALCIUM CHLORIDE: 600; 310; 30; 20 INJECTION, SOLUTION INTRAVENOUS at 12:21

## 2022-01-11 RX ADMIN — HYDROMORPHONE HYDROCHLORIDE 0.5 MG: 2 INJECTION, SOLUTION INTRAMUSCULAR; INTRAVENOUS; SUBCUTANEOUS at 12:54

## 2022-01-11 RX ADMIN — LIDOCAINE HYDROCHLORIDE 80 MG: 20 INJECTION, SOLUTION EPIDURAL; INFILTRATION; INTRACAUDAL at 12:24

## 2022-01-11 RX ADMIN — HYDROMORPHONE HYDROCHLORIDE 0.5 MG: 2 INJECTION, SOLUTION INTRAMUSCULAR; INTRAVENOUS; SUBCUTANEOUS at 12:46

## 2022-01-11 RX ADMIN — CEFAZOLIN 2 G: 330 INJECTION, POWDER, FOR SOLUTION INTRAMUSCULAR; INTRAVENOUS at 12:22

## 2022-01-11 RX ADMIN — LIDOCAINE HYDROCHLORIDE 0.5 ML: 10 INJECTION, SOLUTION EPIDURAL; INFILTRATION; INTRACAUDAL; PERINEURAL at 11:20

## 2022-01-11 RX ADMIN — FENTANYL CITRATE 50 MCG: 50 INJECTION, SOLUTION INTRAMUSCULAR; INTRAVENOUS at 12:35

## 2022-01-11 RX ADMIN — METOCLOPRAMIDE 10 MG: 5 INJECTION, SOLUTION INTRAMUSCULAR; INTRAVENOUS at 12:22

## 2022-01-11 RX ADMIN — GLYCOPYRROLATE 0.1 MG: 0.2 INJECTION INTRAMUSCULAR; INTRAVENOUS at 12:22

## 2022-01-11 RX ADMIN — KETOROLAC TROMETHAMINE 30 MG: 30 INJECTION, SOLUTION INTRAMUSCULAR at 13:18

## 2022-01-11 RX ADMIN — MIDAZOLAM HYDROCHLORIDE 2 MG: 1 INJECTION, SOLUTION INTRAMUSCULAR; INTRAVENOUS at 12:24

## 2022-01-11 RX ADMIN — FENTANYL CITRATE 50 MCG: 50 INJECTION, SOLUTION INTRAMUSCULAR; INTRAVENOUS at 12:24

## 2022-01-11 RX ADMIN — DEXAMETHASONE SODIUM PHOSPHATE 4 MG: 4 INJECTION, SOLUTION INTRA-ARTICULAR; INTRALESIONAL; INTRAMUSCULAR; INTRAVENOUS; SOFT TISSUE at 12:30

## 2022-01-11 RX ADMIN — PROPOFOL 200 MG: 10 INJECTION, EMULSION INTRAVENOUS at 12:26

## 2022-01-11 RX ADMIN — ONDANSETRON 4 MG: 2 INJECTION INTRAMUSCULAR; INTRAVENOUS at 12:22

## 2022-01-11 RX ADMIN — SODIUM CHLORIDE, POTASSIUM CHLORIDE, SODIUM LACTATE AND CALCIUM CHLORIDE: 600; 310; 30; 20 INJECTION, SOLUTION INTRAVENOUS at 11:20

## 2022-01-11 ASSESSMENT — PAIN SCALES - GENERAL: PAIN_LEVEL: 0

## 2022-01-11 NOTE — OR NURSING
1325 Patient arrived to PACU from OR.  Report from anesthesia and RN.  Oral airway in place.  1329 Oral airway d/c'd.  1348 Son Martinez called and updated on patient status.  1415 Patient arouses to voice, denies pain.  1445 Patient denies pain and states she is ready to discharge home.  1453 Report to Paul SCHROEDER.  1456 Patient transferred to Phase 2.

## 2022-01-11 NOTE — OP REPORT
Urologic Surgery Operative Report     Pre-op Diagnosis: Left nephrolithiasis   Post-op Diagnosis: Same as above   Procedure: L ureteroscopy, laser lithotripsy, left stent placement   Surgeon: Surgeon(s) and Role:     * Macario Shea M.D. - Primary   Assistant: Circulator: Pushpa Hernandez R.N.; Yumiko James R.N.  Laser Staff: Dominik Haynes Scrub: Yousif Wheeler  Scrub Person: Lasha Dorado   Anesthesia: General,   Anesthesiologist: Leonardo Ahumada M.D.   Estimated Blood Loss: * No blood loss amount entered *   IV fluids <1L Crystalloid    Specimens: 1. Stone for chemical analysis    Complications: None   Condition: Stable, procedure well tolerated    Drains: 1. [id number], JJ stent((on strings) )  2. No knight   Disposition:  1. PACU, discharge after voiding  2. f/u 5days for stent removal   Findings 1. 0.2 cm stone at Left upper, mid and lower pole  2. Ureteral stenosis distal and proximal      Indication:   After a full discussion of alternatives, risks, and benefits the patient consented to proceeding with Ureteroscopy, laser lithotripsy and possible JJ stent placement on the respective side.  She understands the risk of inability to access ureter, the need for second procedures, the possibility of negative ureteroscopy, that she may have stent discomfort until this is removed, bleeding, infection, ureteral injury or stricture, bladder injury, post op urinary retention requiring knight catheter, and the general pulmonary and cardiovascular risks associated with anesthesia.     Procedure Details:   The patient was taken to operating room and placed on table in supine position.  ancef was administered prior to the start of the procedure based on previous urine cultures. Sequential compression devices were placed for deep venous thrombosis prophylaxis. After induction of general anesthesia, both legs were placed in Haja stirrups in the standard lithotomy position.  A timeout was held confirming the  correct patient, procedure and laterality.   The perineal area was prepped and draped in a sterile fashion. A rigid cystoscope was inserted into the urethra and the bladder was emptied and then distended with sterile saline. Urethral sounds were not needed to dilate the urethral meatus. Cystoscopy revealed normal bladder mucosa, orthotopic ureteral orifices, and no other abnormalities.    We passed a wire through the ureteral orifice of the respective side into the renal pelvis which was visualized under fluoroscopic vision. Dual lumen advanced over the wire and a second wire placed.   Flexible ureteroscope advanced over the second wire through the ureteral orifice.  Moderate stenosis noted at the distal ureter however scope was maneuvered past without any ureteral injury.  Ureteral spasm versus secondary to stenosis at the proximal ureter again noted and scope advanced up to level the renal pelvis.  Moderate amount of hydronephrosis noted and minimal mobility of the scope due to stenosis was encountered.  I was able to complete pan pyeloscopy and multiple Fuentes's plaques noted in the upper pole midpole and lower pole calyces.  All visible plaques were fractured with a 200 µm holmium laser fiber and at the conclusion no stone large enough to basket was encountered.  Scope was withdrawn revealing no stones in the ureter, significant proximal stenosis and a 1 cm section of distal stenosis as well.  No mucosal defect identified.    Returnign to rigid cystoscope, we then placed a L-sided JJ stent, [id number], JJ stent (on strings)  under fluoroscopy. We then saw that the JJ stent was in appropriate position, with a good curl visualized in the renal pelvis fluoroscopically and a good curl in the bladder endoscopically. The cystoscope was removed after emptying the bladder.  The patient was awoken from anesthesia and brought to recovery in satisfactory condition.     Will plan for stent removal in office in 5days and in  6-8wks with renal u/s prior.        Macario Shea M.D.   [address] [facility]  [place] [address]   [phone]

## 2022-01-11 NOTE — ANESTHESIA PREPROCEDURE EVALUATION
Case: [id number] Date/Time: 01/11/22 1215    Procedures:       CYSTOSCOPY, WITH URETERAL STENT INSERTION (Left )      URETEROSCOPY      LITHOTRIPSY, USING LASER    Pre-op diagnosis: KIDNEY STONE    Location: [facility] OR 18 / SURGERY [facility]    Surgeons: LISBET Jean H&P:  PAST MEDICAL HISTORY:   41 y.o. female who presents for Procedure(s) (LRB):  CYSTOSCOPY, WITH URETERAL STENT INSERTION (Left)  URETEROSCOPY  LITHOTRIPSY, USING LASER.  She has current and past medical problems significant for:    Denies pregnancy    Patient denies history of seizures or strokes  Patient denies history of diabetes or thyroid disease  Patient denies history of GERD or esophageal disease  Patient denies history of SERGIO  Patient denies history of previous MI, chest pain or shortness of breath  Patient denies history of upper or lower extremity motor/sensory deficits   Patient denies history of bleeding disorders  Patient denies history of complications with anesthesia    Able to climb 2 flights of stair without dyspnea or angina, > 4 METs     Past Medical History:   Diagnosis Date   • Renal disorder     kidney stones       SMOKING/ALCOHOL/RECREATIONAL DRUG USE:  Social History     Tobacco Use   • Smoking status: Never Smoker   • Smokeless tobacco: Never Used   Vaping Use   • Vaping Use: Never used   Substance Use Topics   • Alcohol use: No   • Drug use: No     Social History     Substance and Sexual Activity   Drug Use No       PAST SURGICAL HISTORY:  History reviewed. No pertinent surgical history.    ALLERGIES:   No Known Allergies    MEDICATIONS:  No current facility-administered medications on file prior to encounter.     Current Outpatient Medications on File Prior to Encounter   Medication Sig Dispense Refill   • NON SPECIFIED ABX for UTI started 1/10/2022     • acetaminophen (TYLENOL) 500 MG Tab Take 500 mg by mouth every 6 hours as needed.     • tamsulosin (FLOMAX) 0.4 MG capsule Take 1 Capsule by mouth 1/2  hour after breakfast. (Patient not taking: Reported on 1/7/2022) 30 Capsule 0   • ibuprofen (MOTRIN) 600 MG Tab          LABS:  Lab Results   Component Value Date/Time    HEMOGLOBIN 14.5 01/07/2022 1447    HEMATOCRIT 42.5 01/07/2022 1447    WBC 9.5 01/07/2022 1447     Lab Results   Component Value Date/Time    SODIUM 138 01/07/2022 1447    POTASSIUM 4.1 01/07/2022 1447    CHLORIDE 105 01/07/2022 1447    CO2 19 (L) 01/07/2022 1447    GLUCOSE 98 01/07/2022 1447    BUN 12 01/07/2022 1447    CALCIUM 8.7 01/07/2022 1447       SARS-CoV2 result: Negative      PREVIOUS ANESTHETICS: See EMR  __________________________________________    Relevant Problems   No relevant active problems       Physical Exam    Airway   Mallampati: II  TM distance: >3 FB  Neck ROM: full       Cardiovascular - normal exam  Rhythm: regular  Rate: normal  (-) murmur     Dental - normal exam  (+) upper dentures, lower dentures           Pulmonary - normal exam  Breath sounds clear to auscultation     Abdominal   (+) obese     Neurological - normal exam                 Anesthesia Plan    ASA 2       Plan - general       Airway plan will be LMA          Induction: intravenous    Postoperative Plan: Postoperative administration of opioids is intended.    Pertinent diagnostic labs and testing reviewed    Informed Consent:    Anesthetic plan and risks discussed with patient.    Use of blood products discussed with: patient whom consented to blood products.

## 2022-01-11 NOTE — OR NURSING
1502: Patient arrived from PACU to Phase II with RN. Patient is alert and awake. Updated on POC. Patient tolerating PO intake.  1520: Patient is able to void. Criteria met to discharge patient.  1525: Discharge paperwork reviewed with patient. Discussed activity, site care, worsening symptoms, and follow-up. Verbalized understanding. No further questions. PIV removed, tip intact.  1535: Patient escorted out in wheelchair with RN. Discharge instructions and personal belongings in possession of the patient. Copy of discharge instructions signed and placed in the chart. Patient discharged to home with son.

## 2022-01-11 NOTE — ANESTHESIA POSTPROCEDURE EVALUATION
Patient: Jagruti Harrison    Procedure Summary     Date: 01/11/22 Room / Location: [place] [address] / SURGERY [facility]    Anesthesia Start: 1221 Anesthesia Stop: 1334    Procedures:       CYSTOSCOPY, WITH URETERAL STENT INSERTION (Left Bladder)      URETEROSCOPY (Left Ureter)      LITHOTRIPSY, USING LASER (Left Ureter) Diagnosis: (KIDNEY STONE LEFT)    Surgeons: Macario Shea M.D. Responsible Provider: Leonardo Ahumada M.D.    Anesthesia Type: general ASA Status: 2          Final Anesthesia Type: general  Last vitals  BP   Blood Pressure: 141/89    Temp   36 °C (96.8 °F)    Pulse   100   Resp   18    SpO2   99 %      Anesthesia Post Evaluation    Patient location during evaluation: PACU  Patient participation: complete - patient participated  Level of consciousness: awake and alert  Pain score: 0    Airway patency: patent  Anesthetic complications: no  Cardiovascular status: hemodynamically stable  Respiratory status: acceptable  Hydration status: euvolemic    PONV: none          No complications documented.     Nurse Pain Score: 0 (NPRS)

## 2022-01-11 NOTE — INTERVAL H&P NOTE
Patient seen and evaluated. No new complaints. Exam unchanged. Will proceed with planned procedure as scheduled.   L flank pain and stones, here for L CULTS

## 2022-01-11 NOTE — ANESTHESIA TIME REPORT
Anesthesia Start and Stop Event Times     Date Time Event    1/11/2022 1211 Ready for Procedure     1221 Anesthesia Start     1334 Anesthesia Stop        Responsible Staff  01/11/22    Name Role Begin End    Leonardo Ahumada M.D. Anesth 1221 1334        Preop Diagnosis (Free Text):  Pre-op Diagnosis     KIDNEY STONE LEFT        Preop Diagnosis (Codes):    Premium Reason  Non-Premium    Comments:

## 2022-01-11 NOTE — ANESTHESIA PROCEDURE NOTES
Airway    Date/Time: 1/11/2022 12:28 PM  Performed by: Leonardo Ahumada M.D.  Authorized by: Leonardo Ahumada M.D.     Location:  OR  Urgency:  Elective  Indications for Airway Management:  Anesthesia      Spontaneous Ventilation: absent    Sedation Level:  Deep  Preoxygenated: Yes    Mask Difficulty Assessment:  0 - not attempted  Final Airway Type:  Supraglottic airway  Final Supraglottic Airway:  Standard LMA    SGA Size:  4  Number of Attempts at Approach:  1   Atraumatic x1

## 2022-01-11 NOTE — DISCHARGE INSTRUCTIONS
ACTIVITY: Rest and take it easy for the first 24 hours.  A responsible adult is recommended to remain with you during that time.  It is normal to feel sleepy.  We encourage you to not do anything that requires balance, judgment or coordination.    MILD FLU-LIKE SYMPTOMS ARE NORMAL. YOU MAY EXPERIENCE GENERALIZED MUSCLE ACHES, THROAT IRRITATION, HEADACHE AND/OR SOME NAUSEA.    FOR 24 HOURS DO NOT:  Drive, operate machinery or run household appliances.  Drink beer or alcoholic beverages.   Make important decisions or sign legal documents.    SPECIAL INSTRUCTIONS:   DISCHARGE INSTRUCTIONS FOLLOWING   URETEROSCOPY AND LASER LITHOTRIPSY       DIET:   You can resume your regular diet. We encourage you to eat well-balanced and nutritious meals.       ACTIVITY:   Please restrain from strenuous activity or heavy lifting (more than 20 pounds) for the next week.  Please walk daily as much as tolerated, making exercise a part of your daily life. Do not drive while using narcotics for pain control. You may resumes showering and bathing without any restrictions.       WOUND CARE:   1. You have no dressing or open wounds to manage.   2. You do have a internal ureteral stent on strings.  Please do not pull these strings as they will be used at your follow up visit to remove the stent.     MEDICATIONS:   1. Please use an over the counter antiinflammatory (ie Ibuprofen, naproxen, Ketoralac) and/or Tylenol for pain control as needed.   2. You may take 3 days of OTC Azo for numbing the bladder and burning with urination.   3a. You have been prescribed oxybutynin prn to help with bladder spasms or burning with urination. Please hold this if having difficulty urinating however.   3b. If you have severe pain refractory to Ibuprofen you may use norco for pain control as well as prescribed. If taking a narcotic, please use a stool softener like miralax or colace to prevent constipation.   3c. Please use flomax daily as prescribed while  you have a stent in place to lessen stone pain.   4. If you are using aspirin, Plavix, or coumadin, please don't restart these medications until two days after your discharge if you are not having large amounts of blood in the urine.     FOLLOW-UP:   We will call you to schedule follow up for stent removal in 5 days.     We recommend an ultrasound at 6-8 weeks to confirm the swelling (hydronephrosis) of the kidney(s) has resolved.     We will plan to discuss stone metabolic work-up at your follow-up in 6 to 8 weeks, this includes urine and blood tests that can help us determine why you form kidney stones.     WARNING SIGNS:   Fever greater than 101 degrees Fahrenheit, chills, nausea or vomiting, Large amount of clots in urine that make it difficult to urinate or for urine to drain from knight, increasing pain, or abdominal swelling. If you are experiencing these symptoms, call the Urology Clinic or go to your local PCP or emergency room.     It is normal to see blood in your urine for up to 2 weeks even from surgery. The urine may clear up entirely, and then turn bloody again a few days later depending on your activity level; do not be alarmed. However, if you experience severe pain or tenderness, have a lot of increased bleeding, or find that you are unable to urinate because of large clots, please notify your doctor immediately    DIET: To avoid nausea, slowly advance diet as tolerated, avoiding spicy or greasy foods for the first day.  Add more substantial food to your diet according to your physician's instructions.  Babies can be fed formula or breast milk as soon as they are hungry.  INCREASE FLUIDS AND FIBER TO AVOID CONSTIPATION.    FOLLOW-UP APPOINTMENT:  A follow-up appointment should be arranged with your doctor in 5 days; call to schedule.    You should CALL YOUR PHYSICIAN if you develop:  Fever greater than 101 degrees F.  Pain not relieved by medication, or persistent nausea or vomiting.  Excessive  bleeding (blood soaking through dressing) or unexpected drainage from the wound.  Extreme redness or swelling around the incision site, drainage of pus or foul smelling drainage.  Inability to urinate or empty your bladder within 8 hours.  Problems with breathing or chest pain.    You should call 911 if you develop problems with breathing or chest pain.  If you are unable to contact your doctor or surgical center, you should go to the nearest emergency room or urgent care center.  Physician's telephone #: [phone]    If any questions arise, call your doctor.  If your doctor is not available, please feel free to call the Surgical Center at [phone].     A registered nurse may call you a few days after your surgery to see how you are doing after your procedure.    MEDICATIONS: Resume taking daily medication.  Take prescribed pain medication with food.  If no medication is prescribed, you may take non-aspirin pain medication if needed.  PAIN MEDICATION CAN BE VERY CONSTIPATING.  Take a stool softener or laxative such as senokot, pericolace, or milk of magnesia if needed.    Prescription given for Norco, Oxybutynin, and Tamsulosin.    If your physician has prescribed pain medication that includes Acetaminophen (Tylenol), do not take additional Acetaminophen (Tylenol) while taking the prescribed medication.    Depression / Suicide Risk    As you are discharged from this [facility] facility, it is important to learn how to keep safe from harming yourself.    Recognize the warning signs:  · Abrupt changes in personality, positive or negative- including increase in energy   · Giving away possessions  · Change in eating patterns- significant weight changes-  positive or negative  · Change in sleeping patterns- unable to sleep or sleeping all the time   · Unwillingness or inability to communicate  · Depression  · Unusual sadness, discouragement and loneliness  · Talk of wanting to die  · Neglect of personal  appearance   · Rebelliousness- reckless behavior  · Withdrawal from people/activities they love  · Confusion- inability to concentrate     If you or a loved one observes any of these behaviors or has concerns about self-harm, here's what you can do:  · Talk about it- your feelings and reasons for harming yourself  · Remove any means that you might use to hurt yourself (examples: pills, rope, extension cords, firearm)  · Get professional help from the community (Mental Health, Substance Abuse, psychological counseling)  · Do not be alone:Call your Safe Contact- someone whom you trust who will be there for you.  · Call your local CRISIS HOTLINE 958-3045 or 906-563-9793  · Call your local Children's Mobile Crisis Response Team [place] (956) 269-1214 or www.[employer]  · Call the toll free National Suicide Prevention Hotlines   · National Suicide Prevention Lifeline 878-626-FWQK (9115)  · National Hope Line Network 800-SUICIDE (729-8227)

## 2022-04-30 ENCOUNTER — OFFICE VISIT (OUTPATIENT)
Dept: URGENT CARE | Facility: CLINIC | Age: 42
End: 2022-04-30
Payer: MEDICAID

## 2022-04-30 VITALS
DIASTOLIC BLOOD PRESSURE: 70 MMHG | HEART RATE: 88 BPM | RESPIRATION RATE: 18 BRPM | WEIGHT: 183 LBS | OXYGEN SATURATION: 97 % | BODY MASS INDEX: 32.43 KG/M2 | HEIGHT: 63 IN | SYSTOLIC BLOOD PRESSURE: 126 MMHG | TEMPERATURE: 98 F

## 2022-04-30 DIAGNOSIS — R09.89 RALES: ICD-10-CM

## 2022-04-30 DIAGNOSIS — R05.9 COUGH: ICD-10-CM

## 2022-04-30 DIAGNOSIS — R05.8 SPUTUM PRODUCTION: ICD-10-CM

## 2022-04-30 DIAGNOSIS — M54.32 SCIATICA OF LEFT SIDE: ICD-10-CM

## 2022-04-30 PROCEDURE — 99214 OFFICE O/P EST MOD 30 MIN: CPT | Performed by: STUDENT IN AN ORGANIZED HEALTH CARE EDUCATION/TRAINING PROGRAM

## 2022-04-30 RX ORDER — PREDNISONE 20 MG/1
20 TABLET ORAL DAILY
Qty: 5 TABLET | Refills: 0 | Status: SHIPPED | OUTPATIENT
Start: 2022-04-30 | End: 2022-05-05

## 2022-04-30 RX ORDER — AMOXICILLIN 500 MG/1
1000 CAPSULE ORAL 3 TIMES DAILY
Qty: 30 CAPSULE | Refills: 0 | Status: SHIPPED | OUTPATIENT
Start: 2022-04-30 | End: 2022-05-05

## 2022-04-30 RX ORDER — BENZONATATE 100 MG/1
100 CAPSULE ORAL 3 TIMES DAILY PRN
Qty: 15 CAPSULE | Refills: 0 | Status: SHIPPED | OUTPATIENT
Start: 2022-04-30 | End: 2022-05-05

## 2022-04-30 NOTE — LETTER
SRINIVASAN  [facility]  [address] [address]     April 30, 2022    Patient: Jagruti Harrison   YOB: 1980   Date of Visit: 4/30/2022       To Whom It May Concern:    Jagruti Harrison was seen and treated in our department on 4/30/2022.  Patient is excuse from work on 4/30/2022.  Patient may return to work on 5/1/2022 without restriction.    Sincerely,     Cj George P.A.-C.

## 2022-04-30 NOTE — PROGRESS NOTES
Subjective:   Jagruti Harrison is a 41 y.o. female who presents for Leg Pain (Left leg dull pain from thigh radiates to the ankle/cough/[id number])      HPI:  Pleasant 41-year-old female presents to clinic for 2 weeks of progressive cough.  Patient states that her cough was initially dry but is now become productive and she feels like she has a lot of chest congestion that she cannot get out.  Patient denies any sick contacts at home or sick contacts outside the house.  Patient is able to maintain adequate oral intake of fluids and solids.  Patient denies any other URI symptoms.  Patient denies fever, chills, diaphoresis, sinus pain, sore throat, ear pain, eye discharge, chest pain, palpitations, lower leg swelling, lower leg pain, shortness of breath, wheezing, nausea, vomiting, abdominal pain, diarrhea, constipation, dizziness, or headache.    Patient also presents for sciatica pain in the left leg that has been going on for approximately 2 weeks.  Patient states that she does have a history of sciatica.  Patient denies any trauma or falls that caused this most recent episode of her sciatica.  Patient states that her pain is worse when she bends forward and twist to the side.  Patient is able to walk and does not have any increasing pain when doing so.  She states that she has a little bit of burning and tingling that goes down from her left lower back down to her foot.  Patient denies any numbness, saddle anesthesias, loss of bladder control, or loss of bowel control.  No flank pain or dysuria.      Medications:    • acetaminophen Tabs  • amoxicillin Caps  • benzonatate Caps  • ibuprofen Tabs  • oxybutynin Tabs  • predniSONE Tabs  • tamsulosin    Allergies: Patient has no known allergies.    Problem List: Jagruti Harrison does not have a problem list on file.    Surgical History:  Past Surgical History:   Procedure Laterality Date   • [place] CYSTOSCOPY,INSERT URETERAL STENT Left 1/11/2022     "Procedure: CYSTOSCOPY, WITH URETERAL STENT INSERTION;  Surgeon: Macario Shea M.D.;  Location: SURGERY [facility];  Service: Urology   • OR CYSTO/URETERO/PYELOSCOPY, DX Left 1/11/2022    Procedure: URETEROSCOPY;  Surgeon: Macario Shea M.D.;  Location: SURGERY [facility];  Service: Urology   • LASERTRIPSY Left 1/11/2022    Procedure: LITHOTRIPSY, USING LASER;  Surgeon: Macario Shea M.D.;  Location: SURGERY [facility];  Service: Urology       Past Social Hx: Jagruti Harrison  reports that she has never smoked. She has never used smokeless tobacco. She reports that she does not drink alcohol and does not use drugs.     Past Family Hx:  Jagruti Harrison family history is not on file.     Problem list, medications, and allergies reviewed by myself today in Epic.     Objective:     /70 (BP Location: Right arm, Patient Position: Sitting)   Pulse 88   Temp 36.7 °C (98 °F) (Temporal)   Resp 18   Ht 1.6 m (5' 3\")   Wt 83 kg (183 lb)   SpO2 97%   BMI 32.42 kg/m²     Physical Exam  Vitals reviewed.   Constitutional:       General: She is not in acute distress.     Appearance: Normal appearance.   HENT:      Head: Normocephalic.      Right Ear: Tympanic membrane, ear canal and external ear normal.      Left Ear: Tympanic membrane, ear canal and external ear normal.      Nose: Nose normal.      Mouth/Throat:      Mouth: Mucous membranes are moist.   Eyes:      Conjunctiva/sclera: Conjunctivae normal.      Pupils: Pupils are equal, round, and reactive to light.   Cardiovascular:      Rate and Rhythm: Normal rate and regular rhythm.      Pulses: Normal pulses.      Heart sounds: Normal heart sounds. No murmur heard.  Pulmonary:      Effort: Pulmonary effort is normal. No respiratory distress.      Breath sounds: No stridor. Examination of the right-upper field reveals rales. Rales present. No wheezing or rhonchi.   Musculoskeletal:      Cervical back: Normal and normal range of " motion.      Thoracic back: Normal.      Lumbar back: Tenderness present. No swelling, edema, deformity, signs of trauma, lacerations, spasms or bony tenderness. Normal range of motion. Positive left straight leg raise test. Negative right straight leg raise test. No scoliosis.   Lymphadenopathy:      Cervical: No cervical adenopathy.   Skin:     General: Skin is warm and dry.      Capillary Refill: Capillary refill takes less than 2 seconds.      Findings: No erythema, lesion or rash.   Neurological:      General: No focal deficit present.      Mental Status: She is alert and oriented to person, place, and time.         Assessment/Plan:     Diagnosis and associated orders:     1. Sciatica of left side  predniSONE (DELTASONE) 20 MG Tab   2. Rales  amoxicillin (AMOXIL) 500 MG Cap   3. Sputum production  amoxicillin (AMOXIL) 500 MG Cap   4. Cough  amoxicillin (AMOXIL) 500 MG Cap    benzonatate (TESSALON) 100 MG Cap      Comments/MDM:     • Patient presents with 2 different complaints at this time.  Patient's first complaint is a cough of 2 weeks that has progressed from a dry cough to cough that is productive of sputum.  Patient states that she has a lot of chest congestion that she feels like she cannot get out.  Patient's physical exam shows rales in the right upper lung field.  This finding is consistent with possible community-acquired pneumonia.  Because of suspicion for pneumonia, patient was prescribed amoxicillin and educated on the use this medication the possible side effects including allergic reaction.  Patient is good understanding of this.  Patient was also given Tessalon due to her coughing worse at night.  Patient may use this at night to help control her cough and allow her to sleep better.  Patient was educated on this medication and she has good understanding of the use of it.  • Patient second complaint is sciatica of the left leg that started 2 weeks ago.  Patient has no trauma or falls that  caused this.  Patient was given 5-day course of prednisone to help alleviate her symptoms of sciatica.  Patient should also ice the back approximately 5 times a day for 15 to 20 minutes at a time.  Patient may also use Tylenol every 4-6 hours.  Patient was advised on home range of motion exercises that she may start to improve healing.  • Patient was given ED precautions.  Patient has good understanding of the signs and symptoms of warrant immediate reevaluation.         Differential diagnosis, natural history, supportive care, and indications for immediate follow-up discussed.    Advised the patient to follow-up with the primary care physician for recheck, reevaluation, and consideration of further management.    Please note that this dictation was created using voice recognition software. I have made a reasonable attempt to correct obvious errors, but I expect that there are errors of grammar and possibly content that I did not discover before finalizing the note.    Electronically signed by Cj George PA-C.

## 2022-08-09 ENCOUNTER — OFFICE VISIT (OUTPATIENT)
Dept: URGENT CARE | Facility: CLINIC | Age: 42
End: 2022-08-09
Payer: MEDICAID

## 2022-08-09 VITALS
WEIGHT: 193 LBS | HEART RATE: 85 BPM | BODY MASS INDEX: 34.2 KG/M2 | DIASTOLIC BLOOD PRESSURE: 72 MMHG | HEIGHT: 63 IN | RESPIRATION RATE: 14 BRPM | SYSTOLIC BLOOD PRESSURE: 114 MMHG | OXYGEN SATURATION: 99 % | TEMPERATURE: 97.7 F

## 2022-08-09 DIAGNOSIS — S86.912A KNEE STRAIN, LEFT, INITIAL ENCOUNTER: ICD-10-CM

## 2022-08-09 DIAGNOSIS — M23.304 DERANGEMENT OF MEDIAL MENISCUS OF LEFT KNEE: ICD-10-CM

## 2022-08-09 PROCEDURE — 99213 OFFICE O/P EST LOW 20 MIN: CPT | Performed by: PHYSICIAN ASSISTANT

## 2022-08-09 RX ORDER — MELOXICAM 7.5 MG/1
7.5 TABLET ORAL DAILY
Qty: 30 TABLET | Refills: 2 | Status: SHIPPED | OUTPATIENT
Start: 2022-08-09 | End: 2023-08-06

## 2022-08-09 ASSESSMENT — ENCOUNTER SYMPTOMS
CHILLS: 0
FEVER: 0
SENSORY CHANGE: 0
VOMITING: 0
TINGLING: 0
NAUSEA: 0

## 2022-08-09 NOTE — LETTER
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    If you have any questions or concerns, please don't hesitate to call [phone]          Sincerely,          Branden Salgado P.A.-C.  Electronically Signed

## 2022-08-09 NOTE — PROGRESS NOTES
"Subjective:   Jagruti Harrison  is a 42 y.o. female who presents for Knee Pain ((L) x 3 days with pain and swelling.  No known injury. )      Knee Pain  This is a new problem. The current episode started in the past 7 days. Pertinent negatives include no chills, fever, nausea, rash or vomiting.   Patient resents urgent care noting swelling and discomfort associated to left knee times last 2 to 3 days.  She does work long days on her feet and has noted after the last 2 full days of work and increased pain and achy discomfort associated.  Patient denies catching locking.  Denies instability.  Notes some periodic tingling to left lower extremity after longer hours on her feet.  Denies history of knee surgeries or significant left knee injuries.  Has tried treatment with OTC ibuprofen with minimal change in symptoms.  Denies bracing wrapping or crutches tried for treatment thus far.    Review of Systems   Constitutional: Negative for chills and fever.   Gastrointestinal: Negative for nausea and vomiting.   Musculoskeletal: Positive for joint pain ( left knee).   Skin: Negative for rash.   Neurological: Negative for tingling and sensory change.       No Known Allergies     Objective:   /72   Pulse 85   Temp 36.5 °C (97.7 °F) (Temporal)   Resp 14   Ht 1.6 m (5' 3\")   Wt 87.5 kg (193 lb)   SpO2 99%   BMI 34.19 kg/m²     Physical Exam  Vitals and nursing note reviewed.   Constitutional:       General: She is not in acute distress.     Appearance: She is well-developed. She is obese. She is not diaphoretic.   HENT:      Head: Normocephalic and atraumatic.      Right Ear: External ear normal.      Left Ear: External ear normal.      Nose: Nose normal.   Eyes:      General: Lids are normal. No scleral icterus.        Right eye: No discharge.         Left eye: No discharge.      Conjunctiva/sclera: Conjunctivae normal.   Pulmonary:      Effort: Pulmonary effort is normal. No respiratory distress. "   Musculoskeletal:         General: Normal range of motion.      Cervical back: Neck supple.      Left knee: Swelling and effusion ( Mild) present. No erythema or ecchymosis. Normal range of motion ( Pain with forced flexion and forced extension). Tenderness present over the medial joint line. No lateral joint line, MCL, LCL or patellar tendon tenderness. No LCL laxity, MCL laxity, ACL laxity or PCL laxity.Normal meniscus.      Comments: Negative Jonny special test, negative Lachman's special test, stable with anterior and posterior drawer, stable with varus and valgus stress   Skin:     General: Skin is warm and dry.      Coloration: Skin is not pale.      Findings: No erythema.   Neurological:      Mental Status: She is alert and oriented to person, place, and time. She is not disoriented.   Psychiatric:         Speech: Speech normal.         Behavior: Behavior normal.     DX KNEE -unable to perform as x-rays done at the site; patient declines traveling to another site for treatment    Patient is fit with a pro hinged style knee brace    Assessment/Plan:   1. Knee strain, left, initial encounter  - DX-KNEE COMPLETE 4+ LEFT; Future  - Referral to Orthopedics  - meloxicam (MOBIC) 7.5 MG Tab; Take 1 Tablet by mouth every day.  Dispense: 30 Tablet; Refill: 2    2. Derangement of medial meniscus of left knee  - Referral to Orthopedics  - meloxicam (MOBIC) 7.5 MG Tab; Take 1 Tablet by mouth every day.  Dispense: 30 Tablet; Refill: 2  Recommend conservative care, rest, ice, elevation, work on gentle ROM exercises  Return to clinic with lack of resolution or progression of symptoms.  F/u w/ orthopedics  Rx meloxicam, no other NSAIDs while using  I have worn an N95 mask, gloves and eye protection for the entire encounter with this patient.     Differential diagnosis, natural history, supportive care, and indications for immediate follow-up discussed.

## 2023-05-03 ENCOUNTER — OFFICE VISIT (OUTPATIENT)
Dept: URGENT CARE | Facility: CLINIC | Age: 43
End: 2023-05-03
Payer: MEDICAID

## 2023-05-03 VITALS
SYSTOLIC BLOOD PRESSURE: 130 MMHG | DIASTOLIC BLOOD PRESSURE: 86 MMHG | TEMPERATURE: 97.1 F | WEIGHT: 190 LBS | HEART RATE: 86 BPM | HEIGHT: 63 IN | OXYGEN SATURATION: 98 % | RESPIRATION RATE: 16 BRPM | BODY MASS INDEX: 33.66 KG/M2

## 2023-05-03 DIAGNOSIS — L30.9 DERMATITIS: ICD-10-CM

## 2023-05-03 PROCEDURE — 99213 OFFICE O/P EST LOW 20 MIN: CPT | Performed by: NURSE PRACTITIONER

## 2023-05-03 RX ORDER — TRIAMCINOLONE ACETONIDE 0.25 MG/G
1 CREAM TOPICAL 2 TIMES DAILY
Qty: 15 G | Refills: 0 | Status: SHIPPED | OUTPATIENT
Start: 2023-05-03 | End: 2023-08-22

## 2023-05-03 ASSESSMENT — ENCOUNTER SYMPTOMS
CARDIOVASCULAR NEGATIVE: 1
GASTROINTESTINAL NEGATIVE: 1
SHORTNESS OF BREATH: 0
RESPIRATORY NEGATIVE: 1
FEVER: 0
CONSTITUTIONAL NEGATIVE: 1
EYES NEGATIVE: 1

## 2023-05-04 NOTE — PROGRESS NOTES
"Subjective:   Jagruti Harrison is a 42 y.o. female who presents for Rash (Facial rash)      Rash  This is a new problem. Episode onset: 3 weeks. The problem is unchanged. The affected locations include the face. The rash is characterized by dryness, redness and itchiness. It is unknown if there was an exposure to a precipitant. Pertinent negatives include no facial edema, fever or shortness of breath. Past treatments include nothing. Her past medical history is significant for allergies.     Review of Systems   Constitutional: Negative.  Negative for chills and fever.   HENT: Negative.     Eyes: Negative.    Respiratory: Negative.  Negative for shortness of breath.    Cardiovascular: Negative.    Gastrointestinal: Negative.    Genitourinary: Negative.    Musculoskeletal: Negative.    Skin:  Positive for rash.        Rash around right perioral area and nasolabial fold   Neurological: Negative.      Medications, Allergies, and current problem list reviewed today in Epic.     Objective:     /86 (BP Location: Left arm, Patient Position: Sitting, BP Cuff Size: Adult)   Pulse 86   Temp 36.2 °C (97.1 °F) (Temporal)   Resp 16   Ht 1.6 m (5' 3\")   Wt 86.2 kg (190 lb)   SpO2 98%     Physical Exam  Vitals reviewed.   Constitutional:       Appearance: Normal appearance.   HENT:      Head: Normocephalic and atraumatic.      Nose: Nose normal.      Mouth/Throat:      Mouth: Mucous membranes are moist.      Pharynx: Oropharynx is clear.   Eyes:      Extraocular Movements: Extraocular movements intact.      Conjunctiva/sclera: Conjunctivae normal.      Pupils: Pupils are equal, round, and reactive to light.   Cardiovascular:      Rate and Rhythm: Normal rate and regular rhythm.   Pulmonary:      Effort: Pulmonary effort is normal.      Breath sounds: Normal breath sounds.   Abdominal:      General: Abdomen is flat.      Palpations: Abdomen is soft.   Musculoskeletal:         General: Normal range of motion. "      Cervical back: Normal range of motion and neck supple.   Skin:     General: Skin is warm and dry.      Capillary Refill: Capillary refill takes less than 2 seconds.      Findings: Erythema and rash present. Rash is urticarial.             Comments: There is a mild urticarial rash to the right perioral and right nasolabial fold region.  There is some slight erythema with this.  No evidence of infection.  No drainage.   Neurological:      General: No focal deficit present.      Mental Status: She is alert.   Psychiatric:         Mood and Affect: Mood normal.         Behavior: Behavior normal.       Assessment/Plan:     Diagnosis and associated orders:     1. Dermatitis  triamcinolone acetonide (KENALOG) 0.025 % Cream         Comments/MDM:     Patient will be treated with triamcinolone cream twice daily to the affected areas.  She will apply this for 1 to 2 weeks.  If the rash does not resolve, patient will return to PCP or urgent care for further evaluation and treatment recommendations.         Differential diagnosis, natural history, supportive care, and indications for immediate follow-up discussed.    Advised the patient to follow-up with the primary care physician for recheck, reevaluation, and consideration of further management.    Please note that this dictation was created using voice recognition software. I have made a reasonable attempt to correct obvious errors, but I expect that there are errors of grammar and possibly content that I did not discover before finalizing the note.    This note was electronically signed by JESUS Kemp

## 2023-05-07 ASSESSMENT — ENCOUNTER SYMPTOMS
CHILLS: 0
MUSCULOSKELETAL NEGATIVE: 1
NEUROLOGICAL NEGATIVE: 1

## 2023-08-06 ENCOUNTER — OFFICE VISIT (OUTPATIENT)
Dept: URGENT CARE | Facility: CLINIC | Age: 43
End: 2023-08-06
Payer: MEDICAID

## 2023-08-06 VITALS
SYSTOLIC BLOOD PRESSURE: 132 MMHG | DIASTOLIC BLOOD PRESSURE: 88 MMHG | TEMPERATURE: 97.8 F | HEIGHT: 63 IN | WEIGHT: 185 LBS | OXYGEN SATURATION: 99 % | HEART RATE: 91 BPM | BODY MASS INDEX: 32.78 KG/M2 | RESPIRATION RATE: 16 BRPM

## 2023-08-06 DIAGNOSIS — S93.402A SPRAIN OF LEFT ANKLE, UNSPECIFIED LIGAMENT, INITIAL ENCOUNTER: ICD-10-CM

## 2023-08-06 PROCEDURE — 3079F DIAST BP 80-89 MM HG: CPT | Performed by: NURSE PRACTITIONER

## 2023-08-06 PROCEDURE — 3075F SYST BP GE 130 - 139MM HG: CPT | Performed by: NURSE PRACTITIONER

## 2023-08-06 PROCEDURE — 99213 OFFICE O/P EST LOW 20 MIN: CPT | Performed by: NURSE PRACTITIONER

## 2023-08-06 ASSESSMENT — ENCOUNTER SYMPTOMS
INABILITY TO BEAR WEIGHT: 1
LOSS OF MOTION: 0
LOSS OF SENSATION: 0

## 2023-08-07 NOTE — PROGRESS NOTES
Subjective:   Jagruti Harrison is a 43 y.o. female who presents for Foot Injury (L foot, stepped wrong down some stairs, heard a pop. )      Ankle Injury   Incident onset: 2 weks ago. The injury mechanism was an inversion injury. The pain is present in the left ankle. The quality of the pain is described as aching. The pain is severe. The pain has been Worsening since onset. Associated symptoms include an inability to bear weight. Pertinent negatives include no loss of motion or loss of sensation. The symptoms are aggravated by movement, palpation and weight bearing. She has tried rest, NSAIDs, ice and acetaminophen for the symptoms. The treatment provided no relief.       Review of Systems   Musculoskeletal:  Positive for joint pain.       Medications:    triamcinolone acetonide Crea    Allergies: Patient has no known allergies.    Problem List: Jagruti Harrison does not have a problem list on file.    Surgical History:  Past Surgical History:   Procedure Laterality Date    [place] CYSTOSCOPY,INSERT URETERAL STENT Left 1/11/2022    Procedure: CYSTOSCOPY, WITH URETERAL STENT INSERTION;  Surgeon: Macario Shea M.D.;  Location: [facility];  Service: Urology    [place] CYSTO/URETERO/PYELOSCOPY, DX Left 1/11/2022    Procedure: URETEROSCOPY;  Surgeon: Macario Shea M.D.;  Location: [facility];  Service: Urology    LASERTRIPSY Left 1/11/2022    Procedure: LITHOTRIPSY, USING LASER;  Surgeon: Macario Shea M.D.;  Location: [facility];  Service: Urology       Past Social Hx: Jagruti Harrison  reports that she has never smoked. She has never used smokeless tobacco. She reports that she does not drink alcohol and does not use drugs.     Past Family Hx:  Jagruti Harrison family history is not on file.     Problem list, medications, and allergies reviewed by myself today in Epic.     Objective:     /88   Pulse 91   Temp 36.6 °C (97.8 °F)   Resp  "16   Ht 1.6 m (5' 3\")   Wt 83.9 kg (185 lb)   SpO2 99%   BMI 32.77 kg/m²     Physical Exam  Constitutional:       Appearance: Normal appearance. She is not ill-appearing or toxic-appearing.   HENT:      Head: Normocephalic.      Right Ear: External ear normal.      Left Ear: External ear normal.      Nose: Nose normal.      Mouth/Throat:      Lips: Pink.   Eyes:      General: Lids are normal.   Pulmonary:      Effort: Pulmonary effort is normal. No accessory muscle usage.   Musculoskeletal:      Cervical back: Full passive range of motion without pain.      Left ankle: Swelling present. Tenderness present over the lateral malleolus and medial malleolus. No base of 5th metatarsal tenderness. Decreased range of motion.      Left foot: Normal.   Neurological:      Mental Status: She is alert and oriented to person, place, and time.   Psychiatric:         Mood and Affect: Mood normal.         Thought Content: Thought content normal.         Assessment/Plan:     Diagnosis and associated orders:     1. Sprain of left ankle, unspecified ligament, initial encounter  DX-ANKLE 3+ VIEWS LEFT         Comments/MDM:     I personally reviewed prior external notes and prior test results pertinent to today's visit.  Unfortunately x-ray imaging not available clinic location this evening patient was provided a tall walking cam boot and crutches.  X-ray order provided patient will return to clinic tomorrow morning to have imaging completed.  We will follow-up with results and treat as indicated.  If necessary we will place referral to orthopedics.  Recommended to rest, ice, Tyle Motrin as needed for pain.  Discussed management options, risks and benefits, and alternatives to treatment plan agreed upon.   Red flags discussed and indications to immediately call 911 or present to the Emergency Department.   Supportive care, differential diagnoses, and indications for immediate follow-up discussed with patient.    Patient expresses " understanding and agrees to plan. Patient denies any other questions or concerns.              Please note that this dictation was created using voice recognition software. I have made a reasonable attempt to correct obvious errors, but I expect that there are errors of grammar and possibly content that I did not discover before finalizing the note.    This note was electronically signed by Paul LEE.

## 2023-08-20 ENCOUNTER — HOSPITAL ENCOUNTER (EMERGENCY)
Facility: MEDICAL CENTER | Age: 43
End: 2023-08-20
Payer: MEDICAID

## 2023-08-20 VITALS
DIASTOLIC BLOOD PRESSURE: 124 MMHG | BODY MASS INDEX: 35.44 KG/M2 | RESPIRATION RATE: 18 BRPM | TEMPERATURE: 99.5 F | WEIGHT: 200 LBS | HEART RATE: 123 BPM | HEIGHT: 63 IN | SYSTOLIC BLOOD PRESSURE: 191 MMHG | OXYGEN SATURATION: 95 %

## 2023-08-20 PROCEDURE — 302449 STATCHG TRIAGE ONLY (STATISTIC)

## 2023-08-20 NOTE — ED NOTES
Apologised to pt about wait and let her know she had a room Pt yelling profanities in lobby and being verbally abusive to staff. Pt got up out of wheel chair and walked out

## 2023-08-20 NOTE — ED TRIAGE NOTES
"Chief Complaint   Patient presents with    Flu Like Symptoms     Began Friday, +fever/chills, body aches, weakness, +COVID test yesterday, EMS temp 102.9 and given 1000mg APAP, ER temp 99.5F        BIB [facility] to triage in  for above complaint. Pt with HTN and ST, states no hx with either. No nausea/vomiting.    Pt educated of triage process and informed to contact staff if situation changes.    BP (!) 191/124   Pulse (!) 123   Temp 37.5 °C (99.5 °F) (Temporal)   Resp 18   Ht 1.6 m (5' 3\")   Wt 90.7 kg (200 lb)   SpO2 95%   BMI 35.43 kg/m²      "

## 2023-08-22 ENCOUNTER — OFFICE VISIT (OUTPATIENT)
Dept: URGENT CARE | Facility: CLINIC | Age: 43
End: 2023-08-22
Payer: MEDICAID

## 2023-08-22 VITALS
DIASTOLIC BLOOD PRESSURE: 90 MMHG | SYSTOLIC BLOOD PRESSURE: 130 MMHG | OXYGEN SATURATION: 100 % | HEART RATE: 95 BPM | WEIGHT: 194 LBS | TEMPERATURE: 96.7 F | RESPIRATION RATE: 18 BRPM | BODY MASS INDEX: 34.38 KG/M2 | HEIGHT: 63 IN

## 2023-08-22 DIAGNOSIS — R50.9 FEVER, UNSPECIFIED FEVER CAUSE: ICD-10-CM

## 2023-08-22 LAB
FLUAV RNA SPEC QL NAA+PROBE: NEGATIVE
FLUBV RNA SPEC QL NAA+PROBE: NEGATIVE
RSV RNA SPEC QL NAA+PROBE: NEGATIVE
SARS-COV-2 RNA RESP QL NAA+PROBE: NEGATIVE

## 2023-08-22 PROCEDURE — 3075F SYST BP GE 130 - 139MM HG: CPT | Performed by: PHYSICIAN ASSISTANT

## 2023-08-22 PROCEDURE — 99213 OFFICE O/P EST LOW 20 MIN: CPT | Performed by: PHYSICIAN ASSISTANT

## 2023-08-22 PROCEDURE — 3080F DIAST BP >= 90 MM HG: CPT | Performed by: PHYSICIAN ASSISTANT

## 2023-08-22 PROCEDURE — 87637 SARSCOV2&INF A&B&RSV AMP PRB: CPT | Mod: QW | Performed by: PHYSICIAN ASSISTANT

## 2023-08-22 ASSESSMENT — ENCOUNTER SYMPTOMS
FEVER: 1
NAUSEA: 1
ABDOMINAL PAIN: 0
SORE THROAT: 0
CARDIOVASCULAR NEGATIVE: 1
CHILLS: 1
MYALGIAS: 1
SHORTNESS OF BREATH: 0
SWEATS: 1
COUGH: 1
RHINORRHEA: 1
DIZZINESS: 0
VOMITING: 1
WHEEZING: 0
HEADACHES: 1
DIARRHEA: 0

## 2023-08-22 NOTE — LETTER
August 22, 2023         Patient: Jagruti Harrison   YOB: 1980   Date of Visit: 8/22/2023           To Whom it May Concern:    Jagruti Harrison was seen in my clinic on 8/22/2023. Please excuse any absences from work this week due to acute illness.        If you have any questions or concerns, please don't hesitate to call.        Sincerely,           Paul Lopez P.A.-C.  Electronically Signed

## 2023-08-29 ASSESSMENT — ENCOUNTER SYMPTOMS: PALPITATIONS: 0

## 2025-06-09 ENCOUNTER — APPOINTMENT (OUTPATIENT)
Dept: URGENT CARE | Facility: CLINIC | Age: 45
End: 2025-06-09
Payer: MEDICAID

## (undated) DEVICE — HEAD HOLDER JUNIOR/ADULT

## (undated) DEVICE — ZIPWIRE STIFF .035 STRAIGHT TIP (5EA/BX)

## (undated) DEVICE — CANISTER SUCTION 3000ML MECHANICAL FILTER AUTO SHUTOFF MEDI-VAC NONSTERILE LF DISP  (40EA/CA)

## (undated) DEVICE — SPONGE GAUZESTER 4 X 4 4PLY - (128PK/CA)

## (undated) DEVICE — TOWEL STOP TIMEOUT SAFETY FLAG (40EA/CA)

## (undated) DEVICE — MASK ANESTHESIA ADULT  - (100/CA)

## (undated) DEVICE — STERI STRIP COMPOUND BENZOIN - TINCTURE 0.6ML WITH APPLICATOR (40EA/BX)

## (undated) DEVICE — SET LEADWIRE 5 LEAD BEDSIDE DISPOSABLE ECG (1SET OF 5/EA)

## (undated) DEVICE — ADAPTOR UROLOK - 10/BX

## (undated) DEVICE — CATHETER URET DUAL LUMEN

## (undated) DEVICE — CONNECTOR HOSE NEPTUNE FOR CYSTO ROOM

## (undated) DEVICE — GLOVE BIOGEL ECLIPSE PF LATEX SIZE 7.5

## (undated) DEVICE — FIBER LASER MOSES 200 UM (1/EA)

## (undated) DEVICE — WATER IRRIG. STER 3000 ML - (4/CA)

## (undated) DEVICE — SODIUM CHL. IRRIGATION 0.9% 3000ML (4EA/CA 65CA/PF)

## (undated) DEVICE — WIRE GUIDE SENSOR DUAL FLEX - 5/BX

## (undated) DEVICE — PACK CYSTO III (2EA/CA)

## (undated) DEVICE — KIT ANESTHESIA W/CIRCUIT & 3/LT BAG W/FILTER (20EA/CA)

## (undated) DEVICE — SLEEVE VASO CALF MED - (10PR/CA)

## (undated) DEVICE — SUCTION INSTRUMENT YANKAUER BULBOUS TIP W/O VENT (50EA/CA)

## (undated) DEVICE — ELECTRODE 850 FOAM ADHESIVE - HYDROGEL RADIOTRNSPRNT (50/PK)

## (undated) DEVICE — SENSOR SPO2 NEO LNCS ADHESIVE (20/BX) SEE USER NOTES

## (undated) DEVICE — CLOSURE SKIN STRIP 1/2 X 4 IN - (STERI STRIP) (50/BX 4BX/CA)

## (undated) DEVICE — BASKET ZERO 1.9FR X 120CM

## (undated) DEVICE — BAG URODRAIN WITH TUBING - (20/CA)

## (undated) DEVICE — CONTAINER SPECIMEN BAG OR - STERILE 4 OZ W/LID (100EA/CA)

## (undated) DEVICE — GOWN WARMING STANDARD FLEX - (30/CA)

## (undated) DEVICE — PROTECTOR ULNA NERVE - (36PR/CA)

## (undated) DEVICE — NEPTUNE 4 PORT MANIFOLD - (20/PK)

## (undated) DEVICE — SCOPE DIGITAL URETEROSCOPE DISPOSABLE

## (undated) DEVICE — LACTATED RINGERS INJ 1000 ML - (14EA/CA 60CA/PF)

## (undated) DEVICE — GOWN SURGEONS X-LARGE - DISP. (30/CA)

## (undated) DEVICE — SET EXTENSION WITH 2 PORTS (48EA/CA) ***PART #2C8610 IS A SUBSTITUTE*****

## (undated) DEVICE — TUBING CLEARLINK DUO-VENT - C-FLO (48EA/CA)

## (undated) DEVICE — GLOVE BIOGEL SZ 7 SURGICAL PF LTX - (50PR/BX 4BX/CA)